# Patient Record
Sex: FEMALE | Race: WHITE | NOT HISPANIC OR LATINO | Employment: FULL TIME
[De-identification: names, ages, dates, MRNs, and addresses within clinical notes are randomized per-mention and may not be internally consistent; named-entity substitution may affect disease eponyms.]

---

## 2018-08-14 ENCOUNTER — HEALTH MAINTENANCE LETTER (OUTPATIENT)
Age: 43
End: 2018-08-14

## 2020-03-01 ENCOUNTER — HEALTH MAINTENANCE LETTER (OUTPATIENT)
Age: 45
End: 2020-03-01

## 2020-09-24 ENCOUNTER — OFFICE VISIT (OUTPATIENT)
Dept: FAMILY MEDICINE | Facility: CLINIC | Age: 45
End: 2020-09-24
Payer: COMMERCIAL

## 2020-09-24 VITALS
HEART RATE: 72 BPM | BODY MASS INDEX: 19.84 KG/M2 | DIASTOLIC BLOOD PRESSURE: 73 MMHG | WEIGHT: 130.9 LBS | OXYGEN SATURATION: 100 % | RESPIRATION RATE: 16 BRPM | SYSTOLIC BLOOD PRESSURE: 119 MMHG | TEMPERATURE: 98.6 F | HEIGHT: 68 IN

## 2020-09-24 DIAGNOSIS — Z13.220 SCREENING FOR LIPID DISORDERS: ICD-10-CM

## 2020-09-24 DIAGNOSIS — S39.012D BACK STRAIN, SUBSEQUENT ENCOUNTER: ICD-10-CM

## 2020-09-24 DIAGNOSIS — Z13.1 SCREENING FOR DIABETES MELLITUS: ICD-10-CM

## 2020-09-24 DIAGNOSIS — Z12.39 ENCOUNTER FOR SCREENING FOR MALIGNANT NEOPLASM OF BREAST: ICD-10-CM

## 2020-09-24 DIAGNOSIS — Z00.00 ROUTINE GENERAL MEDICAL EXAMINATION AT A HEALTH CARE FACILITY: Primary | ICD-10-CM

## 2020-09-24 DIAGNOSIS — M41.25 OTHER IDIOPATHIC SCOLIOSIS, THORACOLUMBAR REGION: ICD-10-CM

## 2020-09-24 DIAGNOSIS — Z12.4 SCREENING FOR CERVICAL CANCER: ICD-10-CM

## 2020-09-24 PROCEDURE — 82947 ASSAY GLUCOSE BLOOD QUANT: CPT | Performed by: FAMILY MEDICINE

## 2020-09-24 PROCEDURE — 80061 LIPID PANEL: CPT | Performed by: FAMILY MEDICINE

## 2020-09-24 PROCEDURE — G0145 SCR C/V CYTO,THINLAYER,RESCR: HCPCS | Performed by: FAMILY MEDICINE

## 2020-09-24 PROCEDURE — 36415 COLL VENOUS BLD VENIPUNCTURE: CPT | Performed by: FAMILY MEDICINE

## 2020-09-24 PROCEDURE — 87624 HPV HI-RISK TYP POOLED RSLT: CPT | Performed by: FAMILY MEDICINE

## 2020-09-24 PROCEDURE — 99396 PREV VISIT EST AGE 40-64: CPT | Performed by: FAMILY MEDICINE

## 2020-09-24 ASSESSMENT — MIFFLIN-ST. JEOR: SCORE: 1297.26

## 2020-09-24 NOTE — PATIENT INSTRUCTIONS
PLEASE CALL TO SCHEDULE YOUR MAMMOGRAM  [facility] [phone]  [facility] [phone]  [facility]   [phone]  Central Scheduling (all locations) [phone]    If you are under/uninsured, we recommend you contact the [facility]. They offer mammograms on a sliding fee charge. You can schedule with them at [phone].      Preventive Health Recommendations  Female Ages 40 to 49    Yearly exam:     See your health care provider every year in order to  1. Review health changes.   2. Discuss preventive care.    3. Review your medicines if your doctor prescribed any.      Get a Pap test every three years (unless you have an abnormal result and your provider advises testing more often).      If you get Pap tests with HPV test, you only need to test every 5 years, unless you have an abnormal result. You do not need a Pap test if your uterus was removed (hysterectomy) and you have not had cancer.      You should be tested each year for STDs (sexually transmitted diseases), if you're at risk.     Ask your doctor if you should have a mammogram.      Have a colonoscopy (test for colon cancer) if someone in your family has had colon cancer or polyps before age 50.       Have a cholesterol test every 5 years.       Have a diabetes test (fasting glucose) after age 45. If you are at risk for diabetes, you should have this test every 3 years.    Shots: Get a flu shot each year. Get a tetanus shot every 10 years.     Nutrition:     Eat at least 5 servings of fruits and vegetables each day.    Eat whole-grain bread, whole-wheat pasta and brown rice instead of white grains and rice.    Get adequate Calcium and Vitamin D.      Lifestyle    Exercise at least 150 minutes a week (an average of 30 minutes a day, 5 days a week). This will help you control your weight and prevent disease.    Limit alcohol to one drink per day.    No smoking.     Wear sunscreen to prevent skin  cancer.    See your dentist every six months for an exam and cleaning.

## 2020-09-24 NOTE — PROGRESS NOTES
SUBJECTIVE:   CC: Franca Griggs is an 45 year old woman who presents for preventive health visit.     Left lower back pain- intermittent, for 3 weeks, worse with sitting long  Dull pain right now  last week was more pronounced- after long [place] trip, was hard to get in and out of the bed    Got better on own-this week  Mild scoliosis and kyphotic.  Siting for long hours writing grants.  Better with back stretching  Patient has been advised of split billing requirements and indicates understanding: Yes  Healthy Habits:     Getting at least 3 servings of Calcium per day:  Yes    Bi-annual eye exam:  Yes    Dental care twice a year:  Yes    Sleep apnea or symptoms of sleep apnea:  None    Diet:  Regular (no restrictions)    Frequency of exercise:  4-5 days/week    Duration of exercise:  15-30 minutes    Taking medications regularly:  Yes    Medication side effects:  Not applicable    PHQ-2 Total Score: 0    Additional concerns today:  Yes          PROBLEMS TO ADD ON...    Today's PHQ-2 Score:   PHQ-2 ( 1999 Pfizer) 9/24/2020   Q1: Little interest or pleasure in doing things 0   Q2: Feeling down, depressed or hopeless 0   PHQ-2 Score 0   Q1: Little interest or pleasure in doing things Not at all   Q2: Feeling down, depressed or hopeless Not at all   PHQ-2 Score 0       Abuse: Current or Past (Physical, Sexual or Emotional) - No  Do you feel safe in your environment? Yes    Social History     Tobacco Use     Smoking status: Never Smoker     Smokeless tobacco: Never Used   Substance Use Topics     Alcohol use: Yes     Alcohol/week: 0.0 standard drinks     Comment: Socially     If you drink alcohol do you typically have >3 drinks per day or >7 drinks per week? No    Alcohol Use 9/24/2020   Prescreen: >3 drinks/day or >7 drinks/week? No   Prescreen: >3 drinks/day or >7 drinks/week? -   No flowsheet data found.    Reviewed orders with patient.  Reviewed health maintenance and updated orders accordingly - Yes  BP  "Readings from Last 3 Encounters:   09/24/20 119/73   02/07/16 115/78   09/03/15 108/64    Wt Readings from Last 3 Encounters:   09/24/20 59.4 kg (130 lb 14.4 oz)   02/05/16 82.1 kg (181 lb)   09/03/15 69.4 kg (153 lb)                    Mammogram Screening: Patient under age 50, mutual decision reflected in health maintenance.      Pertinent mammograms are reviewed under the imaging tab.  History of abnormal Pap smear: NO - age 30-65 PAP every 5 years with negative HPV co-testing recommended  PAP / HPV Latest Ref Rng & Units 6/10/2015 10/25/2011   PAP - NIL NIL   HPV 16 DNA NEG Negative -   HPV 18 DNA NEG Negative -   OTHER HR HPV NEG Negative -     Reviewed and updated as needed this visit by clinical staff  Tobacco  Allergies  Meds  Problems  Med Hx  Surg Hx  Fam Hx  Soc Hx          Reviewed and updated as needed this visit by Provider  Tobacco  Allergies  Meds  Problems  Med Hx  Surg Hx  Fam Hx            Review of Systems  CONSTITUTIONAL: NEGATIVE for fever, chills, change in weight  INTEGUMENTARU/SKIN: NEGATIVE for worrisome rashes, moles or lesions  EYES: NEGATIVE for vision changes or irritation  ENT: NEGATIVE for ear, mouth and throat problems  RESP: NEGATIVE for significant cough or SOB  BREAST: NEGATIVE for masses, tenderness or discharge  CV: NEGATIVE for chest pain, palpitations or peripheral edema  GI: NEGATIVE for nausea, abdominal pain, heartburn, or change in bowel habits  : NEGATIVE for unusual urinary or vaginal symptoms. Periods are regular.  MUSCULOSKELETAL: NEGATIVE for significant arthralgias or myalgia  NEURO: NEGATIVE for weakness, dizziness or paresthesias  PSYCHIATRIC: NEGATIVE for changes in mood or affect     OBJECTIVE:   /73   Pulse 72   Temp 98.6  F (37  C) (Oral)   Resp 16   Ht 1.727 m (5' 8\")   Wt 59.4 kg (130 lb 14.4 oz)   SpO2 100%   BMI 19.90 kg/m    Physical Exam  GENERAL: healthy, alert and no distress  EYES: Eyes grossly normal to inspection, PERRL " and conjunctivae and sclerae normal  HENT: ear canals and TM's normal, nose and mouth without ulcers or lesions  NECK: no adenopathy, no asymmetry, masses, or scars and thyroid normal to palpation  RESP: lungs clear to auscultation - no rales, rhonchi or wheezes  BREAST: she does have fibrocystic breast  But no distinct lumps today.normal without masses, tenderness or nipple discharge and no palpable axillary masses or adenopathy  CV: regular rate and rhythm, normal S1 S2, no S3 or S4, no murmur, click or rub, no peripheral edema and peripheral pulses strong  ABDOMEN: soft, nontender, no hepatosplenomegaly, no masses and bowel sounds normal   (female): normal female external genitalia, normal urethral meatus, vaginal mucosa pink, moist, well rugated, and normal cervix/adnexa/uterus without masses or discharge  MS: no gross musculoskeletal defects noted, no edema  SKIN: no suspicious lesions or rashes  NEURO: Normal strength and tone, mentation intact and speech normal  PSYCH: mentation appears normal, affect normal/bright    Diagnostic Test Results:  Labs reviewed in Epic  No results found for this or any previous visit (from the past 24 hour(s)).    ASSESSMENT/PLAN:   Franca 45 year old female was seen today for physical.    Diagnoses and all orders for this visit:    Routine general medical examination at a health care facility  Up to date  On vaccination  Family planning- protection- &  is in the process of looking  Into vasectomy    Screening for cervical cancer  -     Pap imaged thin layer screen with HPV - recommended age 30 - 65 years (select HPV order below)  -     HPV High Risk Types DNA Cervical    Screening for lipid disorders  -     Lipid Profile (Chol, Trig, HDL, LDL calc)    Screening for diabetes mellitus  -     Glucose    Strong family history of  Breast cancer- mom diagnosed at age 42  Also maternal grand mom & maternal aunty.  Franca reports genetic testing for mom was negative  She does has  "fibrocystic breast as well   Encounter for screening for malignant neoplasm of breast  -     MA Screen Bilateral w/Ricardo; Future    She has mild scoliosis and currently back strain is better- advised to monitor posture , ok to take over the counter ibuprofen as needed  and follow up as needed  In next few weeks      Patient has been advised of split billing requirements and indicates understanding: Yes  COUNSELING:  Reviewed preventive health counseling, as reflected in patient instructions       Regular exercise       Healthy diet/nutrition       Vision screening       Hearing screening    Estimated body mass index is 19.9 kg/m  as calculated from the following:    Height as of this encounter: 1.727 m (5' 8\").    Weight as of this encounter: 59.4 kg (130 lb 14.4 oz).        She reports that she has never smoked. She has never used smokeless tobacco.      Counseling Resources:  ATP IV Guidelines  Pooled Cohorts Equation Calculator  Breast Cancer Risk Calculator  BRCA-Related Cancer Risk Assessment: FHS-7 Tool  FRAX Risk Assessment  ICSI Preventive Guidelines  Dietary Guidelines for Americans, 2010  USDA's MyPlate  ASA Prophylaxis  Lung CA Screening    Lauren Busby MD  [facility]  "

## 2020-09-25 LAB
CHOLEST SERPL-MCNC: 152 MG/DL
GLUCOSE SERPL-MCNC: 88 MG/DL (ref 70–99)
HDLC SERPL-MCNC: 64 MG/DL
LDLC SERPL CALC-MCNC: 76 MG/DL
NONHDLC SERPL-MCNC: 88 MG/DL
TRIGL SERPL-MCNC: 60 MG/DL

## 2020-09-29 LAB
COPATH REPORT: NORMAL
PAP: NORMAL

## 2020-09-30 LAB
FINAL DIAGNOSIS: NORMAL
HPV HR 12 DNA CVX QL NAA+PROBE: NEGATIVE
HPV16 DNA SPEC QL NAA+PROBE: NEGATIVE
HPV18 DNA SPEC QL NAA+PROBE: NEGATIVE
SPECIMEN DESCRIPTION: NORMAL
SPECIMEN SOURCE CVX/VAG CYTO: NORMAL

## 2020-10-13 ENCOUNTER — ANCILLARY PROCEDURE (OUTPATIENT)
Dept: MAMMOGRAPHY | Facility: CLINIC | Age: 45
End: 2020-10-13
Attending: FAMILY MEDICINE
Payer: COMMERCIAL

## 2020-10-13 DIAGNOSIS — Z12.39 ENCOUNTER FOR SCREENING FOR MALIGNANT NEOPLASM OF BREAST: ICD-10-CM

## 2020-10-13 PROCEDURE — 77067 SCR MAMMO BI INCL CAD: CPT | Mod: GC | Performed by: STUDENT IN AN ORGANIZED HEALTH CARE EDUCATION/TRAINING PROGRAM

## 2020-10-13 PROCEDURE — 77063 BREAST TOMOSYNTHESIS BI: CPT | Mod: GC | Performed by: STUDENT IN AN ORGANIZED HEALTH CARE EDUCATION/TRAINING PROGRAM

## 2020-10-16 NOTE — RESULT ENCOUNTER NOTE
Sharath Schulte    Please let me know if you have not heard back from  Radiology for need for further imaging.  I am sure they will  Facilitate it but I do not see any future appointments.    Please keep us posted with questions or concerns .      Best Regards,    Lauren Busby MD  [facility]  [phone]

## 2020-10-23 ENCOUNTER — ANCILLARY PROCEDURE (OUTPATIENT)
Dept: MAMMOGRAPHY | Facility: CLINIC | Age: 45
End: 2020-10-23
Attending: FAMILY MEDICINE
Payer: COMMERCIAL

## 2020-10-23 DIAGNOSIS — R92.8 ABNORMAL MAMMOGRAM OF LEFT BREAST: ICD-10-CM

## 2020-10-23 PROCEDURE — 76642 ULTRASOUND BREAST LIMITED: CPT | Mod: LT | Performed by: RADIOLOGY

## 2020-10-23 PROCEDURE — 77065 DX MAMMO INCL CAD UNI: CPT | Mod: LT | Performed by: RADIOLOGY

## 2020-11-09 DIAGNOSIS — R92.8 ABNORMAL FINDING ON BREAST IMAGING: ICD-10-CM

## 2020-11-09 LAB
SARS-COV-2 RNA SPEC QL NAA+PROBE: NOT DETECTED
SPECIMEN SOURCE: NORMAL

## 2020-11-09 PROCEDURE — U0003 INFECTIOUS AGENT DETECTION BY NUCLEIC ACID (DNA OR RNA); SEVERE ACUTE RESPIRATORY SYNDROME CORONAVIRUS 2 (SARS-COV-2) (CORONAVIRUS DISEASE [COVID-19]), AMPLIFIED PROBE TECHNIQUE, MAKING USE OF HIGH THROUGHPUT TECHNOLOGIES AS DESCRIBED BY CMS-2020-01-R: HCPCS | Performed by: FAMILY MEDICINE

## 2020-11-12 ENCOUNTER — ANCILLARY PROCEDURE (OUTPATIENT)
Dept: MAMMOGRAPHY | Facility: CLINIC | Age: 45
End: 2020-11-12
Attending: FAMILY MEDICINE
Payer: COMMERCIAL

## 2020-11-12 DIAGNOSIS — R92.8 ABNORMAL MAMMOGRAM OF LEFT BREAST: ICD-10-CM

## 2020-11-12 DIAGNOSIS — R92.1 BREAST CALCIFICATIONS: Primary | ICD-10-CM

## 2020-11-12 PROCEDURE — 88305 TISSUE EXAM BY PATHOLOGIST: CPT | Mod: GC | Performed by: PATHOLOGY

## 2020-11-12 PROCEDURE — 77066 DX MAMMO INCL CAD BI: CPT | Performed by: RADIOLOGY

## 2020-11-12 PROCEDURE — 19081 BX BREAST 1ST LESION STRTCTC: CPT | Mod: LT | Performed by: RADIOLOGY

## 2020-11-12 RX ORDER — IOPAMIDOL 612 MG/ML
100 INJECTION, SOLUTION INTRAVASCULAR ONCE
Status: COMPLETED | OUTPATIENT
Start: 2020-11-12 | End: 2020-11-12

## 2020-11-12 RX ORDER — LIDOCAINE HYDROCHLORIDE AND EPINEPHRINE 10; 10 MG/ML; UG/ML
10 INJECTION, SOLUTION INFILTRATION; PERINEURAL ONCE
Status: COMPLETED | OUTPATIENT
Start: 2020-11-12 | End: 2020-11-12

## 2020-11-12 RX ADMIN — LIDOCAINE HYDROCHLORIDE AND EPINEPHRINE 10 ML: 10; 10 INJECTION, SOLUTION INFILTRATION; PERINEURAL at 13:46

## 2020-11-12 RX ADMIN — IOPAMIDOL 90 ML: 612 INJECTION, SOLUTION INTRAVASCULAR at 13:23

## 2020-11-16 LAB — COPATH REPORT: NORMAL

## 2020-11-16 NOTE — RESULT ENCOUNTER NOTE
Hi    No signs of breast cancer and that's great    Please keep us posted with questions or concerns .      Best Regards,    Lauren Busby MD  [facility]  [phone]

## 2020-12-14 ENCOUNTER — HEALTH MAINTENANCE LETTER (OUTPATIENT)
Age: 45
End: 2020-12-14

## 2021-10-02 ENCOUNTER — HEALTH MAINTENANCE LETTER (OUTPATIENT)
Age: 46
End: 2021-10-02

## 2021-10-26 ASSESSMENT — ENCOUNTER SYMPTOMS
FEVER: 0
HEMATOCHEZIA: 0
DYSURIA: 0
BREAST MASS: 0
ARTHRALGIAS: 0
HEMATURIA: 0
MYALGIAS: 0
FREQUENCY: 0
PALPITATIONS: 0
DIZZINESS: 0
CHILLS: 0
JOINT SWELLING: 0
SHORTNESS OF BREATH: 0
SORE THROAT: 0
NAUSEA: 0
DIARRHEA: 0
CONSTIPATION: 0
ABDOMINAL PAIN: 0
PARESTHESIAS: 0
HEARTBURN: 0
HEADACHES: 0
EYE PAIN: 0
COUGH: 0
WEAKNESS: 0
NERVOUS/ANXIOUS: 0

## 2021-10-27 NOTE — PROGRESS NOTES
SUBJECTIVE:   CC: Franca Griggs is an 46 year old woman who presents for preventive health visit.     Patient has been advised of split billing requirements and indicates understanding: Yes  Healthy Habits:     Getting at least 3 servings of Calcium per day:  Yes    Bi-annual eye exam:  Yes    Dental care twice a year:  Yes    Sleep apnea or symptoms of sleep apnea:  None    Diet:  Regular (no restrictions)    Frequency of exercise:  1 day/week    Duration of exercise:  15-30 minutes    Taking medications regularly:  Yes    Medication side effects:  None    PHQ-2 Total Score: 0    Additional concerns today:  Yes       concerns about Lump on the back of the patient's neck that started itching   Increase in size in past few month  Started in teenage yr as a small pimple    PROBLEMS TO ADD ON...    Today's PHQ-2 Score:   PHQ-2 ( 1999 Pfizer) 10/26/2021   Q1: Little interest or pleasure in doing things 0   Q2: Feeling down, depressed or hopeless 0   PHQ-2 Score 0   Q1: Little interest or pleasure in doing things Not at all   Q2: Feeling down, depressed or hopeless Not at all   PHQ-2 Score 0       Abuse: Current or Past (Physical, Sexual or Emotional) - No  Do you feel safe in your environment? Yes    Have you ever done Advance Care Planning? (For example, a Health Directive, POLST, or a discussion with a medical provider or your loved ones about your wishes): No, advance care planning information given to patient to review.  Advanced care planning was discussed at today's visit.    Social History     Tobacco Use     Smoking status: Never Smoker     Smokeless tobacco: Never Used   Substance Use Topics     Alcohol use: Yes     Alcohol/week: 0.0 standard drinks     Comment: Socially     If you drink alcohol do you typically have >3 drinks per day or >7 drinks per week? No    No flowsheet data found.    Reviewed orders with patient.  Reviewed health maintenance and updated orders accordingly - Yes  BP Readings from  Last 3 Encounters:   10/28/21 132/83   09/24/20 119/73   02/07/16 115/78    Wt Readings from Last 3 Encounters:   10/28/21 58.2 kg (128 lb 6.4 oz)   09/24/20 59.4 kg (130 lb 14.4 oz)   02/05/16 82.1 kg (181 lb)                    Breast Cancer Screening:    FHS-7:   Breast CA Risk Assessment (FHS-7) 10/26/2021   Did any of your first-degree relatives have breast or ovarian cancer? Yes   Did any of your relatives have bilateral breast cancer? Yes   Did any man in your family have breast cancer? No   Did any woman in your family have breast and ovarian cancer? No   Did any woman in your family have breast cancer before age 50 y? Yes   Do you have 2 or more relatives with breast and/or ovarian cancer? Yes   Do you have 2 or more relatives with breast and/or bowel cancer? Yes         Pertinent mammograms are reviewed under the imaging tab.    History of abnormal Pap smear: NO - age 30-65 PAP every 5 years with negative HPV co-testing recommended  PAP / HPV Latest Ref Rng & Units 9/24/2020 6/10/2015 10/25/2011   PAP (Historical) - NIL NIL NIL   HPV16 NEG:Negative Negative Negative -   HPV18 NEG:Negative Negative Negative -   HRHPV NEG:Negative Negative Negative -     Reviewed and updated as needed this visit by clinical staff  Tobacco  Allergies  Meds  Problems  Med Hx  Surg Hx  Fam Hx          Reviewed and updated as needed this visit by Provider  Tobacco  Allergies  Meds  Problems  Med Hx  Surg Hx  Fam Hx             Review of Systems   Constitutional: Negative for chills and fever.   HENT: Negative for congestion, ear pain, hearing loss and sore throat.    Eyes: Negative for pain and visual disturbance.   Respiratory: Negative for cough and shortness of breath.    Cardiovascular: Negative for chest pain, palpitations and peripheral edema.   Gastrointestinal: Negative for abdominal pain, constipation, diarrhea, heartburn, hematochezia and nausea.   Breasts:  Negative for tenderness, breast mass and  "discharge.   Genitourinary: Negative for dysuria, frequency, genital sores, hematuria, pelvic pain, urgency, vaginal bleeding and vaginal discharge.   Musculoskeletal: Negative for arthralgias, joint swelling and myalgias.   Skin: Negative for rash.   Neurological: Negative for dizziness, weakness, headaches and paresthesias.   Psychiatric/Behavioral: Negative for mood changes. The patient is not nervous/anxious.       OBJECTIVE:   /83   Pulse 97   Temp 98.7  F (37.1  C) (Temporal)   Ht 1.713 m (5' 7.44\")   Wt 58.2 kg (128 lb 6.4 oz)   LMP 10/13/2021   SpO2 100%   BMI 19.85 kg/m    Physical Exam  GENERAL: healthy, alert and no distress  EYES: Eyes grossly normal to inspection, PERRL and conjunctivae and sclerae normal  HENT: ear canals and TM's normal, nose and mouth without ulcers or lesions  NECK: thyroid normal to palpation and 1.5 inch broad firm mass easily palpable at the nape of the neck.   RESP: lungs clear to auscultation - no rales, rhonchi or wheezes  BREAST: normal without masses, tenderness or nipple discharge and no palpable axillary masses or adenopathy  CV: regular rate and rhythm, normal S1 S2, no S3 or S4, no murmur, click or rub, no peripheral edema and peripheral pulses strong  ABDOMEN: soft, nontender, no hepatosplenomegaly, no masses and bowel sounds normal  MS: no gross musculoskeletal defects noted, no edema  SKIN: no suspicious lesions or rashes  NEURO: Normal strength and tone, mentation intact and speech normal  PSYCH: mentation appears normal, affect normal/bright    Diagnostic Test Results:  Labs reviewed in Epic  No results found for this or any previous visit (from the past 24 hour(s)).    ASSESSMENT/PLAN:   Franca was seen today for physical.    Diagnoses and all orders for this visit:    Routine general medical examination at a health care facility  Comments:  NILM pap 11/2020- next due 11/2025.  annual mammogram    Mass of neck  -DDx include epidermal cyst, vs enalrged LN " "benign vs malignancy  excisional biopsy is recommend         Adult General Surg Referral  -     CBC with platelets    Other idiopathic scoliosis, thoracolumbar region    Fibrocystic breast disease (FCBD), unspecified laterality    Encounter for screening mammogram for malignant neoplasm of breast  -     MA Screen Bilateral w/Ricardo; Future    Other orders  -     REVIEW OF HEALTH MAINTENANCE PROTOCOL ORDERS        Patient has been advised of split billing requirements and indicates understanding: Yes  COUNSELING:  Reviewed preventive health counseling, as reflected in patient instructions       Regular exercise       Healthy diet/nutrition       Family planning       Folic Acid       Colon cancer screening    Estimated body mass index is 19.85 kg/m  as calculated from the following:    Height as of this encounter: 1.713 m (5' 7.44\").    Weight as of this encounter: 58.2 kg (128 lb 6.4 oz).        She reports that she has never smoked. She has never used smokeless tobacco.      Counseling Resources:  ATP IV Guidelines  Pooled Cohorts Equation Calculator  Breast Cancer Risk Calculator  BRCA-Related Cancer Risk Assessment: FHS-7 Tool  FRAX Risk Assessment  ICSI Preventive Guidelines  Dietary Guidelines for Americans, 2010  USDA's MyPlate  ASA Prophylaxis  Lung CA Screening    Lauren Busby MD  [facility]  "

## 2021-10-28 ENCOUNTER — OFFICE VISIT (OUTPATIENT)
Dept: FAMILY MEDICINE | Facility: CLINIC | Age: 46
End: 2021-10-28
Payer: COMMERCIAL

## 2021-10-28 VITALS
OXYGEN SATURATION: 100 % | DIASTOLIC BLOOD PRESSURE: 83 MMHG | BODY MASS INDEX: 20.15 KG/M2 | WEIGHT: 128.4 LBS | HEIGHT: 67 IN | HEART RATE: 97 BPM | SYSTOLIC BLOOD PRESSURE: 132 MMHG | TEMPERATURE: 98.7 F

## 2021-10-28 DIAGNOSIS — Z12.31 ENCOUNTER FOR SCREENING MAMMOGRAM FOR MALIGNANT NEOPLASM OF BREAST: ICD-10-CM

## 2021-10-28 DIAGNOSIS — N60.19 FIBROCYSTIC BREAST DISEASE (FCBD), UNSPECIFIED LATERALITY: ICD-10-CM

## 2021-10-28 DIAGNOSIS — Z00.00 ROUTINE GENERAL MEDICAL EXAMINATION AT A HEALTH CARE FACILITY: Primary | ICD-10-CM

## 2021-10-28 DIAGNOSIS — R22.1 MASS OF NECK: ICD-10-CM

## 2021-10-28 DIAGNOSIS — M41.25 OTHER IDIOPATHIC SCOLIOSIS, THORACOLUMBAR REGION: ICD-10-CM

## 2021-10-28 LAB
ERYTHROCYTE [DISTWIDTH] IN BLOOD BY AUTOMATED COUNT: 13.2 % (ref 10–15)
HCT VFR BLD AUTO: 44 % (ref 35–47)
HGB BLD-MCNC: 13.9 G/DL (ref 11.7–15.7)
MCH RBC QN AUTO: 28.6 PG (ref 26.5–33)
MCHC RBC AUTO-ENTMCNC: 31.6 G/DL (ref 31.5–36.5)
MCV RBC AUTO: 91 FL (ref 78–100)
PLATELET # BLD AUTO: 326 10E3/UL (ref 150–450)
RBC # BLD AUTO: 4.86 10E6/UL (ref 3.8–5.2)
WBC # BLD AUTO: 6.5 10E3/UL (ref 4–11)

## 2021-10-28 PROCEDURE — 99396 PREV VISIT EST AGE 40-64: CPT | Performed by: FAMILY MEDICINE

## 2021-10-28 PROCEDURE — 36415 COLL VENOUS BLD VENIPUNCTURE: CPT | Performed by: FAMILY MEDICINE

## 2021-10-28 PROCEDURE — 85027 COMPLETE CBC AUTOMATED: CPT | Performed by: FAMILY MEDICINE

## 2021-10-28 ASSESSMENT — MIFFLIN-ST. JEOR: SCORE: 1272.05

## 2021-10-28 NOTE — RESULT ENCOUNTER NOTE
Hi    -Normal red blood cell (hgb) levels, normal white blood cell count and normal platelet levels.    Proceed with excisional biopsy as discussed in appointment today.    Please keep us posted with questions or concerns .      Best Regards,    Lauren Busby MD  [facility]  [phone]

## 2021-11-04 ENCOUNTER — TELEPHONE (OUTPATIENT)
Dept: SURGERY | Facility: CLINIC | Age: 46
End: 2021-11-04

## 2021-11-04 ENCOUNTER — OFFICE VISIT (OUTPATIENT)
Dept: SURGERY | Facility: CLINIC | Age: 46
End: 2021-11-04
Attending: FAMILY MEDICINE
Payer: COMMERCIAL

## 2021-11-04 DIAGNOSIS — L72.3 SEBACEOUS CYST: Primary | ICD-10-CM

## 2021-11-04 PROCEDURE — 99242 OFF/OP CONSLTJ NEW/EST SF 20: CPT | Performed by: SURGERY

## 2021-11-04 NOTE — LETTER
November 4, 2021          Lauren Busby MD  [address] [place] [address]      RE:   Franca Griggs 1975      Dear Colleague,    Thank you for referring your patient, Franca Griggs, to [facility]. Please see a copy of my visit note below.    HPI: Patient is a 46-year-old female referred by the above provider for consultation regarding posterior neck mass.  She reports that this has been present since she was a teenager.  It slowly increased in size.  Causes occasional discomfort.  She has had no episodes of infection or drainage.  Some occasional discomfort associated with this.     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  PSH:    has a past surgical history that includes Extraction(s) dental.  Social History:   reports that she has never smoked. She has never used smokeless tobacco. She reports current alcohol use. She reports that she does not use drugs.  Family History:   family history includes Breast Cancer (age of onset: 41) in her mother; Breast Cancer (age of onset: 60) in her maternal aunt and maternal grandmother; C.A.D. (age of onset: 50) in her maternal grandfather; Cerebrovascular Disease (age of onset: 80) in her paternal grandfather; Hypertension in her father; Lipids in her father.  Medications/Allergies: Home medications and allergies reviewed.     ROS:  The 10 point Review of Systems is negative other than noted in the HPI.     Physical Exam:  [facility] 10/13/2021   GENERAL: Generally appears well.  Psych: Alert and Oriented.  Normal affect  Eyes: Sclera clear  Respiratory: Normal respiratory rate, no audible wheezes   Integumentary:  No rashes, the posterior neck there is an elongated palpable mass is quite protuberant.  Somewhat firm.  Certainly more firm than I would expect  with a lipoma as well as more superficial than I would expect with a lipoma I think this likely represents a sebaceous cyst.  Neurological: grossly intact     All new lab and imaging data was reviewed.      Impression and Plan:  Patient is a 46 year old female with posterior neck mass likely sebaceous cyst     PLAN: Options were discussed.  I think this would be easily treated with excision under local anesthetic.  The details of the procedure were outlined.  The patient is interested in proceeding.  This will be scheduled at a date of her convenience.       Again, thank you for allowing me to participate in the care of your patient.      Sincerely,      Trip Ragsdale MD

## 2021-11-04 NOTE — PROGRESS NOTES
[facility]  Surgery Consultation    PCP:  Lauren Busby [phone]    HPI: Patient is a 46-year-old female referred by the above provider for consultation regarding posterior neck mass.  She reports that this has been present since she was a teenager.  It slowly increased in size.  Causes occasional discomfort.  She has had no episodes of infection or drainage.  Some occasional discomfort associated with this.    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  PSH:    has a past surgical history that includes Extraction(s) dental.  Social History:   reports that she has never smoked. She has never used smokeless tobacco. She reports current alcohol use. She reports that she does not use drugs.  Family History:   family history includes Breast Cancer (age of onset: 41) in her mother; Breast Cancer (age of onset: 60) in her maternal aunt and maternal grandmother; C.A.D. (age of onset: 50) in her maternal grandfather; Cerebrovascular Disease (age of onset: 80) in her paternal grandfather; Hypertension in her father; Lipids in her father.  Medications/Allergies: Home medications and allergies reviewed.    ROS:  The 10 point Review of Systems is negative other than noted in the HPI.    Physical Exam:  [facility] 10/13/2021   GENERAL: Generally appears well.  Psych: Alert and Oriented.  Normal affect  Eyes: Sclera clear  Respiratory: Normal respiratory rate, no audible wheezes   Integumentary:  No rashes, the posterior neck there is an elongated palpable mass is quite protuberant.  Somewhat firm.  Certainly more firm than I would expect with a lipoma as well as more superficial than I would expect with a lipoma I think this likely represents a sebaceous cyst.  Neurological: grossly intact    All new lab and imaging data was reviewed.      Impression and Plan:  Patient is a 46 year old female with posterior neck mass likely sebaceous cyst    PLAN: Options were discussed.  I think this would be easily treated with excision under local anesthetic.  The details of the procedure were outlined.  The patient is interested in proceeding.  This will be scheduled at a date of her convenience.      Thank you very much for this consult.    Trip Ragsdale M.D.  [facility]  [phone]    Please route or send letter to:  Primary Care Provider (PCP) and Referring Provider

## 2021-11-04 NOTE — TELEPHONE ENCOUNTER
Orders received for excision sebaceous cyst with Dr. Trip Ragsdale.       Left message for patient to call me at her convenience to schedule surgery.     Mary CRUM    Surgery Coordinator  [facility]  [facility]  [phone]

## 2021-11-05 DIAGNOSIS — Z11.59 ENCOUNTER FOR SCREENING FOR OTHER VIRAL DISEASES: ICD-10-CM

## 2021-11-07 ENCOUNTER — TELEPHONE (OUTPATIENT)
Dept: SURGERY | Facility: CLINIC | Age: 46
End: 2021-11-07
Payer: COMMERCIAL

## 2021-11-07 NOTE — TELEPHONE ENCOUNTER
Type of surgery: Excision posterior neck sebaceous cyst  Location of surgery: [facility] OR  Date and time of surgery: 11/16/21 at 10:30am  Surgeon: Dr. Trip Ragsdale  Pre-Op Appt Date: Patient to schedule  Post-Op Appt Date: Patient to schedule   Packet sent out: Yes  Pre-cert/Authorization completed:  Not Applicable  Date: 11/7/21

## 2021-11-13 ENCOUNTER — LAB (OUTPATIENT)
Dept: URGENT CARE | Facility: URGENT CARE | Age: 46
End: 2021-11-13
Payer: COMMERCIAL

## 2021-11-13 DIAGNOSIS — Z11.59 ENCOUNTER FOR SCREENING FOR OTHER VIRAL DISEASES: ICD-10-CM

## 2021-11-13 PROCEDURE — U0005 INFEC AGEN DETEC AMPLI PROBE: HCPCS

## 2021-11-13 PROCEDURE — U0003 INFECTIOUS AGENT DETECTION BY NUCLEIC ACID (DNA OR RNA); SEVERE ACUTE RESPIRATORY SYNDROME CORONAVIRUS 2 (SARS-COV-2) (CORONAVIRUS DISEASE [COVID-19]), AMPLIFIED PROBE TECHNIQUE, MAKING USE OF HIGH THROUGHPUT TECHNOLOGIES AS DESCRIBED BY CMS-2020-01-R: HCPCS

## 2021-11-15 LAB — SARS-COV-2 RNA RESP QL NAA+PROBE: NEGATIVE

## 2021-11-15 RX ORDER — LIDOCAINE HYDROCHLORIDE 10 MG/ML
10 INJECTION, SOLUTION EPIDURAL; INFILTRATION; INTRACAUDAL; PERINEURAL ONCE
Status: CANCELLED | OUTPATIENT
Start: 2021-11-15

## 2021-11-16 ENCOUNTER — APPOINTMENT (OUTPATIENT)
Dept: SURGERY | Facility: PHYSICIAN GROUP | Age: 46
End: 2021-11-16
Payer: COMMERCIAL

## 2021-11-16 ENCOUNTER — HOSPITAL ENCOUNTER (OUTPATIENT)
Facility: CLINIC | Age: 46
Discharge: HOME | End: 2021-11-16
Attending: SURGERY | Admitting: SURGERY
Payer: COMMERCIAL

## 2021-11-16 VITALS — RESPIRATION RATE: 16 BRPM | OXYGEN SATURATION: 100 %

## 2021-11-16 DIAGNOSIS — G89.18 POSTOPERATIVE PAIN: Primary | ICD-10-CM

## 2021-11-16 DIAGNOSIS — L72.3 SEBACEOUS CYST: ICD-10-CM

## 2021-11-16 PROCEDURE — 12042 INTMD RPR N-HF/GENIT2.6-7.5: CPT

## 2021-11-16 PROCEDURE — 88304 TISSUE EXAM BY PATHOLOGIST: CPT | Mod: TC | Performed by: SURGERY

## 2021-11-16 PROCEDURE — 11422 EXC H-F-NK-SP B9+MARG 1.1-2: CPT | Performed by: SURGERY

## 2021-11-16 PROCEDURE — 250N000009 HC RX 250: Performed by: SURGERY

## 2021-11-16 PROCEDURE — 11420 EXC H-F-NK-SP B9+MARG 0.5/<: CPT | Performed by: SURGERY

## 2021-11-16 PROCEDURE — 11424 EXC H-F-NK-SP B9+MARG 3.1-4: CPT | Performed by: SURGERY

## 2021-11-16 RX ORDER — HYDROCODONE BITARTRATE AND ACETAMINOPHEN 5; 325 MG/1; MG/1
1 TABLET ORAL EVERY 4 HOURS PRN
Qty: 5 TABLET | Refills: 0 | Status: SHIPPED | OUTPATIENT
Start: 2021-11-16 | End: 2021-11-19

## 2021-11-16 RX ORDER — HYDROCODONE BITARTRATE AND ACETAMINOPHEN 5; 325 MG/1; MG/1
1 TABLET ORAL EVERY 4 HOURS PRN
Qty: 5 TABLET | Refills: 0 | Status: CANCELLED | OUTPATIENT
Start: 2021-11-16 | End: 2021-11-19

## 2021-11-16 RX ADMIN — LIDOCAINE HYDROCHLORIDE 1 ML: 10; .005 INJECTION, SOLUTION EPIDURAL; INFILTRATION; INTRACAUDAL; PERINEURAL at 10:35

## 2021-11-16 NOTE — OP NOTE
Preoperative diagnosis: Posterior neck cyst    Postoperative diagnosis: Same (4 cm in greatest transverse dimension)    Procedure: Excision of posterior neck cyst with multilayer closure    Surgeon: Trip Ragsdale MD    Assistant: Shirley Hooper PA-C, Physician assistant first assistant was necessary during this procedure due to expertise in patient positioning, prepping, incisional opening, retraction, exposure, and suctioning.    Anesthesia: Local    Estimated blood loss: 5 cc    Specimens: Posterior neck cyst for permanent    Indication for procedure: This a 46-year-old female who presented to my office with a longstanding posterior neck mass.  This seemed to be consistent with an inclusion cyst.  We discussed management options.  Is elected to see with excision.    Procedure: After informed consent was obtained the patient was taken to one of the endoscopy suite and [facility].  Informed consent was obtained.  She was placed in a prone position.  Area on the posterior neck was shaved, prepped and draped in usual manner.  Timeout was completed.  Local anesthetic was then injected create a field block.  Transverse incision was made overlying this elongated mass.  Sharp dissection was carried to the subcutaneous tissues.  An obvious inclusion cyst was encountered.  This was somewhat friable but with a combination of sharp as well as blunt dissection this was mobilized and ultimately excised.  The cavity was examined and there was no evidence of residual cyst tissue.  This was irrigated with local anesthetic.  The incision was then closed in a multilayer fashion using 3-0 Vicryl in the deep layers and subcuticular 4-0 Monocryl on the skin.  Steri-Strips and dressing were applied.  Patient tolerated this well.  She left in a stable and ambulatory condition.    Trip Ragsdale MD

## 2021-11-16 NOTE — LETTER
[facility] - [facility]  Discharge Instructions: Post-Operative Excision of Mass or Lesion    ACTIVITY    Take frequent, short walks and increase your activity gradually.      Avoid strenuous physical activity or heavy lifting greater than 15-20 lbs. for 1-2 weeks.  You may climb stairs.    You may drive without restrictions when you are not using any prescription pain medication and feel comfortable in a car.    You may return to work/school when you are comfortable without any prescription pain medication.    WOUND CARE    You may remove your outer dressing or Band-Aids and shower 48 hours after the surgery.  Pat your incisions dry and leave them open to air.  Re-apply dressing (Band-Aids or gauze/tape) as needed for comfort or drainage.    You may have steri-strips (looks like white tape) or skin glue on your incisions.  You may peel off the steri-strips 2 weeks after your surgery if they have not peeled off on their own.  If you have skin glue, it will peel up and fall off on its own.    Do not soak your incisions in a tub or pool for 2 weeks.     Do not apply any lotions, creams, or ointments to your incision(s).    A ridge under your incision(s) is normal and will gradually resolve.    DIET    Start with liquids, then gradually resume your regular diet as tolerated.     Drink plenty of liquids to stay hydrated.    PAIN    Expect some tenderness and discomfort at the incision site(s).  Use the prescribed pain medication at your discretion.  Expect gradual resolution of your pain over several days.    You may take ibuprofen with food (unless you have been told not to) or acetaminophen/Tylenol instead of or in addition to your prescribed pain medication.  If you are taking Norco, do not take any additional acetaminophen/Tylenol.    Do not drink alcohol or drive while you are taking pain medications.    You may apply ice to your incisions in 20 minute intervals as needed for the next 48 hours.   After that time, consider switching to heat if you prefer.    EXPECTATIONS    Pain medications can cause constipation.  Limit use when possible.  Take over the counter stool softener/stimulant, such as Colace or Senna, 1-2 times a day with plenty of water.  You may take a mild over the counter laxative, such as Miralax or a suppository, as needed.      You may discontinue these medications once you are having regular bowel movements and/or are no longer taking your narcotic pain medication.    FOLLOW UP    You do not need to follow up with our clinic. If have any questions or concerns, or would like to be seen, please call us at [phone] and ask to speak with our nurse.  We are located at [address] [place] [address].    CALL OUR OFFICE -[phone] IF YOU HAVE:     Chills or fever above 101 F.    Increased redness, warmth, or drainage at your incisions.    Significant bleeding.    Pain not relieved by your pain medication or rest.    Increasing pain after the first 48 hours.    Any other concerns or questions.               **If you have concerns or questions about your procedure,    please contact Dr. Ragsdale at  [phone]**

## 2021-11-17 LAB
PATH REPORT.COMMENTS IMP SPEC: NORMAL
PATH REPORT.COMMENTS IMP SPEC: NORMAL
PATH REPORT.FINAL DX SPEC: NORMAL
PATH REPORT.GROSS SPEC: NORMAL
PATH REPORT.MICROSCOPIC SPEC OTHER STN: NORMAL
PATH REPORT.RELEVANT HX SPEC: NORMAL
PHOTO IMAGE: NORMAL

## 2021-11-17 PROCEDURE — 88304 TISSUE EXAM BY PATHOLOGIST: CPT | Mod: 26 | Performed by: PATHOLOGY

## 2021-11-18 ENCOUNTER — ANCILLARY PROCEDURE (OUTPATIENT)
Dept: MAMMOGRAPHY | Facility: CLINIC | Age: 46
End: 2021-11-18
Attending: FAMILY MEDICINE
Payer: COMMERCIAL

## 2021-11-18 DIAGNOSIS — Z12.31 ENCOUNTER FOR SCREENING MAMMOGRAM FOR MALIGNANT NEOPLASM OF BREAST: ICD-10-CM

## 2021-11-18 PROCEDURE — 77067 SCR MAMMO BI INCL CAD: CPT | Performed by: STUDENT IN AN ORGANIZED HEALTH CARE EDUCATION/TRAINING PROGRAM

## 2021-11-18 PROCEDURE — 77063 BREAST TOMOSYNTHESIS BI: CPT | Performed by: STUDENT IN AN ORGANIZED HEALTH CARE EDUCATION/TRAINING PROGRAM

## 2022-09-03 ENCOUNTER — HEALTH MAINTENANCE LETTER (OUTPATIENT)
Age: 47
End: 2022-09-03

## 2023-01-15 ENCOUNTER — HEALTH MAINTENANCE LETTER (OUTPATIENT)
Age: 48
End: 2023-01-15

## 2023-04-29 ENCOUNTER — HEALTH MAINTENANCE LETTER (OUTPATIENT)
Age: 48
End: 2023-04-29

## 2023-06-06 ENCOUNTER — ANCILLARY PROCEDURE (OUTPATIENT)
Dept: MAMMOGRAPHY | Facility: CLINIC | Age: 48
End: 2023-06-06
Attending: FAMILY MEDICINE
Payer: COMMERCIAL

## 2023-06-06 DIAGNOSIS — Z12.31 VISIT FOR SCREENING MAMMOGRAM: ICD-10-CM

## 2023-06-06 PROCEDURE — 77063 BREAST TOMOSYNTHESIS BI: CPT | Performed by: RADIOLOGY

## 2023-06-06 PROCEDURE — 77067 SCR MAMMO BI INCL CAD: CPT | Performed by: RADIOLOGY

## 2023-07-10 ENCOUNTER — OFFICE VISIT (OUTPATIENT)
Dept: FAMILY MEDICINE | Facility: CLINIC | Age: 48
End: 2023-07-10
Payer: COMMERCIAL

## 2023-07-10 VITALS
RESPIRATION RATE: 16 BRPM | HEART RATE: 95 BPM | BODY MASS INDEX: 20 KG/M2 | TEMPERATURE: 97 F | WEIGHT: 132 LBS | HEIGHT: 68 IN | OXYGEN SATURATION: 98 % | DIASTOLIC BLOOD PRESSURE: 70 MMHG | SYSTOLIC BLOOD PRESSURE: 118 MMHG

## 2023-07-10 DIAGNOSIS — Z80.3 FAMILY HISTORY OF MALIGNANT NEOPLASM OF BREAST: ICD-10-CM

## 2023-07-10 DIAGNOSIS — M41.25 OTHER IDIOPATHIC SCOLIOSIS, THORACOLUMBAR REGION: ICD-10-CM

## 2023-07-10 DIAGNOSIS — E55.9 VITAMIN D INSUFFICIENCY: ICD-10-CM

## 2023-07-10 DIAGNOSIS — Z11.59 NEED FOR HEPATITIS C SCREENING TEST: ICD-10-CM

## 2023-07-10 DIAGNOSIS — Z00.00 ROUTINE GENERAL MEDICAL EXAMINATION AT A HEALTH CARE FACILITY: Primary | ICD-10-CM

## 2023-07-10 DIAGNOSIS — Z13.1 SCREENING FOR DIABETES MELLITUS: ICD-10-CM

## 2023-07-10 DIAGNOSIS — Z13.220 SCREENING FOR LIPID DISORDERS: ICD-10-CM

## 2023-07-10 DIAGNOSIS — Z12.11 SCREEN FOR COLON CANCER: ICD-10-CM

## 2023-07-10 LAB
CHOLEST SERPL-MCNC: 169 MG/DL
DEPRECATED CALCIDIOL+CALCIFEROL SERPL-MC: 38 UG/L (ref 20–75)
FASTING STATUS PATIENT QL REPORTED: YES
GLUCOSE SERPL-MCNC: 95 MG/DL (ref 70–99)
HCV AB SERPL QL IA: NONREACTIVE
HDLC SERPL-MCNC: 62 MG/DL
LDLC SERPL CALC-MCNC: 95 MG/DL
NONHDLC SERPL-MCNC: 107 MG/DL
TRIGL SERPL-MCNC: 62 MG/DL

## 2023-07-10 PROCEDURE — 91312 COVID-19 BIVALENT 12+ (PFIZER): CPT | Performed by: FAMILY MEDICINE

## 2023-07-10 PROCEDURE — 86803 HEPATITIS C AB TEST: CPT | Performed by: FAMILY MEDICINE

## 2023-07-10 PROCEDURE — 80061 LIPID PANEL: CPT | Performed by: FAMILY MEDICINE

## 2023-07-10 PROCEDURE — 0121A COVID-19 BIVALENT 12+ (PFIZER): CPT | Performed by: FAMILY MEDICINE

## 2023-07-10 PROCEDURE — 99396 PREV VISIT EST AGE 40-64: CPT | Mod: 25 | Performed by: FAMILY MEDICINE

## 2023-07-10 PROCEDURE — 82947 ASSAY GLUCOSE BLOOD QUANT: CPT | Performed by: FAMILY MEDICINE

## 2023-07-10 PROCEDURE — 82306 VITAMIN D 25 HYDROXY: CPT | Performed by: FAMILY MEDICINE

## 2023-07-10 PROCEDURE — 36415 COLL VENOUS BLD VENIPUNCTURE: CPT | Performed by: FAMILY MEDICINE

## 2023-07-10 ASSESSMENT — ENCOUNTER SYMPTOMS
WEAKNESS: 0
FEVER: 0
FREQUENCY: 0
DIZZINESS: 0
DIARRHEA: 0
PARESTHESIAS: 0
HEMATOCHEZIA: 0
EYE PAIN: 0
MYALGIAS: 0
SORE THROAT: 0
ABDOMINAL PAIN: 0
HEMATURIA: 0
CHILLS: 0
BREAST MASS: 0
JOINT SWELLING: 0
DYSURIA: 0
COUGH: 0
NERVOUS/ANXIOUS: 0
SHORTNESS OF BREATH: 0
NAUSEA: 0
HEADACHES: 0
HEARTBURN: 0
ARTHRALGIAS: 0
PALPITATIONS: 0
CONSTIPATION: 0

## 2023-07-10 ASSESSMENT — PAIN SCALES - GENERAL: PAINLEVEL: NO PAIN (0)

## 2023-07-10 NOTE — PROGRESS NOTES
SUBJECTIVE:   CC: Franca is an 48 year old who presents for preventive health visit.       7/10/2023     9:27 AM   Additional Questions   Roomed by KARISSA MARCH   Accompanied by N/A         7/10/2023     9:27 AM   Patient Reported Additional Medications   Patient reports taking the following new medications N/A     Healthy Habits:     Getting at least 3 servings of Calcium per day:  Yes    Bi-annual eye exam:  Yes    Dental care twice a year:  Yes    Sleep apnea or symptoms of sleep apnea:  None    Diet:  Regular (no restrictions)    Frequency of exercise:  2-3 days/week    Duration of exercise:  30-45 minutes    Taking medications regularly:  Yes    Medication side effects:  None    Additional concerns today:  No  pap NILM 09/2020 q 3yrs next 9/2025  Mammogram Up to date  6/2023  Colon cancer options discussed & pt declined colonoscopy and willing to complete cologard  Today's PHQ-2 Score:       7/10/2023     8:03 AM   PHQ-2 ( 1999 [employer])   Q1: Little interest or pleasure in doing things 0   Q2: Feeling down, depressed or hopeless 0   PHQ-2 Score 0   Q1: Little interest or pleasure in doing things Not at all   Q2: Feeling down, depressed or hopeless Not at all   PHQ-2 Score 0         Social History     Tobacco Use     Smoking status: Never     Smokeless tobacco: Never   Substance Use Topics     Alcohol use: Yes     Comment: Socially           7/10/2023     8:02 AM   Alcohol Use   Prescreen: >3 drinks/day or >7 drinks/week? No     Reviewed orders with patient.  Reviewed health maintenance and updated orders accordingly - Yes  BP Readings from Last 3 Encounters:   07/10/23 118/70   10/28/21 132/83   09/24/20 119/73    Wt Readings from Last 3 Encounters:   07/10/23 59.9 kg (132 lb)   10/28/21 58.2 kg (128 lb 6.4 oz)   09/24/20 59.4 kg (130 lb 14.4 oz)                    Breast Cancer Screening:    FHS-7:       10/26/2021     7:52 PM 11/18/2021     1:22 PM 6/6/2023    11:34 AM 7/10/2023     8:04 AM   Breast CA Risk  Assessment (FHS-7)   Did any of your first-degree relatives have breast or ovarian cancer? Yes Yes Yes Yes   Did any of your relatives have bilateral breast cancer? Yes Yes Yes Yes   Did any man in your family have breast cancer? No No No No   Did any woman in your family have breast and ovarian cancer? No No No No   Did any woman in your family have breast cancer before age 50 y? Yes Yes Yes Yes   Do you have 2 or more relatives with breast and/or ovarian cancer? Yes Yes Yes Yes   Do you have 2 or more relatives with breast and/or bowel cancer? Yes Yes Yes No     click delete button to remove this line now  Mammogram Screening: Recommended annual mammography  Pertinent mammograms are reviewed under the imaging tab.    History of abnormal Pap smear: NO - age 30-65 PAP every 5 years with negative HPV co-testing recommended      Latest Ref Rng & Units 9/24/2020     9:03 AM 9/24/2020     8:58 AM 6/10/2015    10:16 AM   PAP / HPV   PAP (Historical)   NIL     HPV 16 DNA NEG^Negative Negative   Negative    HPV 18 DNA NEG^Negative Negative   Negative    Other HR HPV NEG^Negative Negative   Negative      Reviewed and updated as needed this visit by clinical staff   Tobacco  Allergies  Meds  Problems  Med Hx  Surg Hx  Fam Hx          Reviewed and updated as needed this visit by Provider      Review of Systems   Constitutional: Negative for chills and fever.   HENT: Negative for congestion, ear pain, hearing loss and sore throat.    Eyes: Negative for pain and visual disturbance.   Respiratory: Negative for cough and shortness of breath.    Cardiovascular: Negative for chest pain, palpitations and peripheral edema.   Gastrointestinal: Negative for abdominal pain, constipation, diarrhea, heartburn, hematochezia and nausea.   Breasts:  Negative for tenderness, breast mass and discharge.   Genitourinary: Negative for dysuria, frequency, genital sores, hematuria, pelvic pain, urgency, vaginal bleeding and vaginal  "discharge.   Musculoskeletal: Negative for arthralgias, joint swelling and myalgias.   Skin: Negative for rash.   Neurological: Negative for dizziness, weakness, headaches and paresthesias.   Psychiatric/Behavioral: Negative for mood changes. The patient is not nervous/anxious.         OBJECTIVE:   /70 (BP Location: Left arm, Patient Position: Sitting, Cuff Size: Adult Regular)   Pulse 95   Temp 97  F (36.1  C) (Temporal)   Resp 16   Ht 1.73 m (5' 8.11\")   Wt 59.9 kg (132 lb)   LMP 06/15/2023 (Exact Date)   SpO2 98%   BMI 20.01 kg/m    Physical Exam  GENERAL: healthy, alert and no distress  EYES: Eyes grossly normal to inspection, PERRL and conjunctivae and sclerae normal  HENT: ear canals and TM's normal, nose and mouth without ulcers or lesions  NECK: no adenopathy, no asymmetry, masses, or scars and thyroid normal to palpation  RESP: lungs clear to auscultation - no rales, rhonchi or wheezes  BREAST: normal without masses, tenderness or nipple discharge and no palpable axillary masses or adenopathy  CV: regular rate and rhythm, normal S1 S2, no S3 or S4, no murmur, click or rub, no peripheral edema and peripheral pulses strong  ABDOMEN: soft, nontender, no hepatosplenomegaly, no masses and bowel sounds normal  MS: no gross musculoskeletal defects noted, no edema  SKIN: no suspicious lesions or rashes  NEURO: Normal strength and tone, mentation intact and speech normal  PSYCH: mentation appears normal, affect normal/bright    Diagnostic Test Results:  Labs reviewed in Epic  No results found for this or any previous visit (from the past 24 hour(s)).    ASSESSMENT/PLAN:   Franca was seen today for physical.    Diagnoses and all orders for this visit:    Routine general medical examination at a health care facility  Comments:  pap NILM 09/2020 q 3yrs next 9/2025  Mammogram Up to date  6/2023  Colon cancer options discussed & pt declined colonoscopy  willing to complete cologard    Screen for colon " cancer  -     Cancel: Colonoscopy Screening  Referral; Future    Family history of malignant neoplasm of breast  Comments:  MOM -40's. maternal aunty& grand mom-,in menpausal yrs. mom &  maternal aunty-negative genetic testing.  Reviewed genetic testing referral offered.Pt declined.    Vitamin D insufficiency  -     Vitamin D Deficiency; Future  -     Vitamin D Deficiency    Need for hepatitis C screening test  -     Hepatitis C Screen Reflex to HCV RNA Quant and Genotype; Future  -     Hepatitis C Screen Reflex to HCV RNA Quant and Genotype    Other idiopathic scoliosis, thoracolumbar region  Comments:  no acute or chronic pain concerns     Screening for lipid disorders  -     Lipid Profile (Chol, Trig, HDL, LDL calc); Future  -     Lipid Profile (Chol, Trig, HDL, LDL calc)    Screening for diabetes mellitus  -     Glucose; Future  -     Glucose    Other orders  -     REVIEW OF HEALTH MAINTENANCE PROTOCOL ORDERS  -     Cancel: COVID-19 BIVALENT 18+ (MODERNA)  -     COVID-19 BIVALENT 12+ (PFIZER)  -     PRIMARY CARE FOLLOW-UP SCHEDULING; Future        Patient has been advised of split billing requirements and indicates understanding: Yes      COUNSELING:  Reviewed preventive health counseling, as reflected in patient instructions       Regular exercise       Healthy diet/nutrition       Vision screening       Hearing screening       Contraception       Family planning       Folic Acid        She reports that she has never smoked. She has never used smokeless tobacco.      Lauren Busby MD  [facility]

## 2023-07-10 NOTE — NURSING NOTE
Prior to immunization administration, verified patients identity using patient s name and date of birth. Please see Immunization Activity for additional information.     Screening Questionnaire for Adult Immunization    Are you sick today?   No   Do you have allergies to medications, food, a vaccine component or latex?   No   Have you ever had a serious reaction after receiving a vaccination?   No   Do you have a long-term health problem with heart, lung, kidney, or metabolic disease (e.g., diabetes), asthma, a blood disorder, no spleen, complement component deficiency, a cochlear implant, or a spinal fluid leak?  Are you on long-term aspirin therapy?   No   Do you have cancer, leukemia, HIV/AIDS, or any other immune system problem?   No   Do you have a parent, brother, or sister with an immune system problem?   No   In the past 3 months, have you taken medications that affect  your immune system, such as prednisone, other steroids, or anticancer drugs; drugs for the treatment of rheumatoid arthritis, Crohn s disease, or psoriasis; or have you had radiation treatments?   No   Have you had a seizure, or a brain or other nervous system problem?   No   During the past year, have you received a transfusion of blood or blood    products, or been given immune (gamma) globulin or antiviral drug?   No   For women: Are you pregnant or is there a chance you could become       pregnant during the next month?   No   Have you received any vaccinations in the past 4 weeks?   No     Immunization questionnaire answers were all negative.      Patient instructed to remain in clinic for 15 minutes afterwards, and to report any adverse reactions.     Screening performed by Colin Ramos MA on 7/10/2023 at 10:19 AM.

## 2023-08-30 ENCOUNTER — LAB (OUTPATIENT)
Dept: FAMILY MEDICINE | Facility: CLINIC | Age: 48
End: 2023-08-30

## 2023-08-30 ENCOUNTER — OFFICE VISIT (OUTPATIENT)
Dept: FAMILY MEDICINE | Facility: CLINIC | Age: 48
End: 2023-08-30
Payer: COMMERCIAL

## 2023-08-30 VITALS
HEART RATE: 60 BPM | TEMPERATURE: 98.1 F | WEIGHT: 135.6 LBS | HEIGHT: 68 IN | RESPIRATION RATE: 12 BRPM | BODY MASS INDEX: 20.55 KG/M2 | DIASTOLIC BLOOD PRESSURE: 85 MMHG | OXYGEN SATURATION: 99 % | SYSTOLIC BLOOD PRESSURE: 133 MMHG

## 2023-08-30 DIAGNOSIS — Z12.11 SCREEN FOR COLON CANCER: ICD-10-CM
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
## 2023-08-30 PROCEDURE — 99213 OFFICE O/P EST LOW 20 MIN: CPT | Performed by: FAMILY MEDICINE

## 2023-08-30 RX ORDER — CEPHALEXIN 500 MG/1
500 CAPSULE ORAL 3 TIMES DAILY
Qty: 21 CAPSULE | Refills: 0 | Status: SHIPPED | OUTPATIENT
Start: 2023-08-30

## 2023-08-30 NOTE — PROGRESS NOTES
Assessment & Plan     Bug bite with infection, initial encounter  Keep the wound clean and dry, keep the legs elevated while resting, start on Keflex 3 times daily for 7 days recommended to add ibuprofen 400 mg 3 times daily for 3 days followed by 3 times daily as needed for pain or swelling  RTC in 1week if no better by then or sooner prn.  Patient verbalised understanding and is agreeable to the plan.    - cephALEXin (KEFLEX) 500 MG capsule; Take 1 capsule (500 mg) by mouth 3 times daily    Screen for colon cancer    - COLOGUARD([employer]); Future             Chart documentation done in part with Dragon Voice recognition Software. Although reviewed after completion, some word and grammatical error may remain.    See Patient Instructions    Corin Medina MD  [facility]    Jesus Manuel Schulte is a 48 year old, presenting for the following health issues:  Insect Bites  Patient is new to the provider, is here today with concerns of having pain and swelling around the insect bite site on the right lower leg, that she sustained while weeding in the yard 2 days ago  Patient denies previous history of insect bite allergy  Had 2 other insect bites on the left leg but they did not seem to be infected  Denies fever, chills      8/30/2023    10:20 AM   Additional Questions   Roomed by Vinita   Accompanied by Self         8/30/2023    10:20 AM   Patient Reported Additional Medications   Patient reports taking the following new medications None       History of Present Illness       Reason for visit:  Infected bug bite right ankle  Symptom onset:  1-3 days ago  Symptoms include:  Tender and painful somewhat itchy  Symptom intensity:  Mild  Symptom progression:  Worsening  Had these symptoms before:  No    She eats 4 or more servings of fruits and vegetables daily.She consumes 0 sweetened beverage(s) daily.She exercises with enough effort to increase her heart rate 20 to 29 minutes per day.  She  "exercises with enough effort to increase her heart rate 4 days per week.   She is taking medications regularly.               Review of Systems   CONSTITUTIONAL: NEGATIVE for fever, chills, change in weight  INTEGUMENTARY/SKIN: as above      Objective    /85 (BP Location: Right arm, Patient Position: Sitting, Cuff Size: Adult Regular)   Pulse 60   Temp 98.1  F (36.7  C) (Oral)   Resp 12   Ht 1.727 m (5' 8\")   Wt 61.5 kg (135 lb 9.6 oz)   LMP 08/05/2023 (Exact Date)   SpO2 99%   BMI 20.62 kg/m    Body mass index is 20.62 kg/m .  Physical Exam   GENERAL: healthy, alert and no distress  SKIN: Right lower leg-about 2.5 to 3 cm erythematous area with surrounding induration and inflammation, no drainage, bleeding noted  Warmth of the area noted  Left lower leg-2 erythematous macular area from insect bite with no sign of inflammation or infection                      "

## 2023-09-22 LAB — NONINV COLON CA DNA+OCC BLD SCRN STL QL: NEGATIVE

## 2023-09-25 NOTE — RESULT ENCOUNTER NOTE
Sharath Schulte,  Your Cologuard-colon cancer screening is negative, this is reassuring.  We will repeat this in 3 years.   Let me know if you have any questions. Take care.  Corin Medina MD

## 2024-09-11 ENCOUNTER — ANCILLARY PROCEDURE (OUTPATIENT)
Dept: MAMMOGRAPHY | Facility: CLINIC | Age: 49
End: 2024-09-11
Attending: FAMILY MEDICINE
Payer: COMMERCIAL

## 2024-09-11 DIAGNOSIS — Z12.31 VISIT FOR SCREENING MAMMOGRAM: ICD-10-CM

## 2024-09-11 PROCEDURE — 77063 BREAST TOMOSYNTHESIS BI: CPT | Mod: GC | Performed by: STUDENT IN AN ORGANIZED HEALTH CARE EDUCATION/TRAINING PROGRAM

## 2024-09-11 PROCEDURE — 77067 SCR MAMMO BI INCL CAD: CPT | Mod: GC | Performed by: STUDENT IN AN ORGANIZED HEALTH CARE EDUCATION/TRAINING PROGRAM

## 2024-09-14 ENCOUNTER — HEALTH MAINTENANCE LETTER (OUTPATIENT)
Age: 49
End: 2024-09-14

## 2024-10-09 ENCOUNTER — OFFICE VISIT (OUTPATIENT)
Dept: FAMILY MEDICINE | Facility: CLINIC | Age: 49
End: 2024-10-09
Payer: COMMERCIAL

## 2024-10-09 VITALS
WEIGHT: 134.9 LBS | BODY MASS INDEX: 20.45 KG/M2 | DIASTOLIC BLOOD PRESSURE: 71 MMHG | RESPIRATION RATE: 16 BRPM | TEMPERATURE: 97.3 F | SYSTOLIC BLOOD PRESSURE: 113 MMHG | OXYGEN SATURATION: 100 % | HEART RATE: 55 BPM | HEIGHT: 68 IN

## 2024-10-09 DIAGNOSIS — Z00.00 ROUTINE GENERAL MEDICAL EXAMINATION AT A HEALTH CARE FACILITY: Primary | ICD-10-CM

## 2024-10-09 LAB
CHOLEST SERPL-MCNC: 173 MG/DL
FASTING STATUS PATIENT QL REPORTED: YES
FASTING STATUS PATIENT QL REPORTED: YES
GLUCOSE SERPL-MCNC: 91 MG/DL (ref 70–99)
HDLC SERPL-MCNC: 74 MG/DL
LDLC SERPL CALC-MCNC: 87 MG/DL
NONHDLC SERPL-MCNC: 99 MG/DL
TRIGL SERPL-MCNC: 58 MG/DL

## 2024-10-09 PROCEDURE — 91320 SARSCV2 VAC 30MCG TRS-SUC IM: CPT | Performed by: FAMILY MEDICINE

## 2024-10-09 PROCEDURE — 36415 COLL VENOUS BLD VENIPUNCTURE: CPT | Performed by: FAMILY MEDICINE

## 2024-10-09 PROCEDURE — 80061 LIPID PANEL: CPT | Performed by: FAMILY MEDICINE

## 2024-10-09 PROCEDURE — 82947 ASSAY GLUCOSE BLOOD QUANT: CPT | Performed by: FAMILY MEDICINE

## 2024-10-09 PROCEDURE — 90480 ADMN SARSCOV2 VAC 1/ONLY CMP: CPT | Performed by: FAMILY MEDICINE

## 2024-10-09 PROCEDURE — 99396 PREV VISIT EST AGE 40-64: CPT | Mod: 25 | Performed by: FAMILY MEDICINE

## 2024-10-09 PROCEDURE — 90471 IMMUNIZATION ADMIN: CPT | Performed by: FAMILY MEDICINE

## 2024-10-09 PROCEDURE — 90656 IIV3 VACC NO PRSV 0.5 ML IM: CPT | Performed by: FAMILY MEDICINE

## 2024-10-09 SDOH — HEALTH STABILITY: PHYSICAL HEALTH: ON AVERAGE, HOW MANY DAYS PER WEEK DO YOU ENGAGE IN MODERATE TO STRENUOUS EXERCISE (LIKE A BRISK WALK)?: 4 DAYS

## 2024-10-09 ASSESSMENT — PAIN SCALES - GENERAL: PAINLEVEL: NO PAIN (0)

## 2024-10-09 ASSESSMENT — SOCIAL DETERMINANTS OF HEALTH (SDOH): HOW OFTEN DO YOU GET TOGETHER WITH FRIENDS OR RELATIVES?: MORE THAN THREE TIMES A WEEK

## 2024-10-09 NOTE — PROGRESS NOTES
Preventive Care Visit  [facility]  Lauren Busby MD, Family Medicine  Oct 9, 2024      Assessment & Plan     Routine general medical examination at a health care facility  Fasting for labs  Fasting lipid, fasting glucose obtained today  Vaccinations updated for flu and covid   pap NILM 09/2020 q 3yrs next 9/2025  Mammogram clear/dense breast  09/2024: repeat annually 092025  Colon cancer options : negative cologard 09/2023, repeat in 3 yrs 09/2026      MOM -40's. maternal aunty& grand mom-,in menpausal yrs. mom &  maternal aunty-negative genetic testing.  Reviewed genetic testing referral offered.Pt declined.    Patient has been advised of split billing requirements and indicates understanding: Yes        Counseling  Appropriate preventive services were addressed with this patient via screening, questionnaire, or discussion as appropriate for fall prevention, nutrition, physical activity, Tobacco-use cessation, social engagement, weight loss and cognition.  Checklist reviewing preventive services available has been given to the patient.  Reviewed patient's diet, addressing concerns and/or questions.   She is at risk for psychosocial distress and has been provided with information to reduce risk.           Jesus Manuel Schulte is a 49 year old, presenting for the following:  Physical        10/9/2024     9:24 AM   Additional Questions   Roomed by [facility]        Health Care Directive  Patient does not have a Health Care Directive or Living Will: Discussed advance care planning with patient; however, patient declined at this time.    HPI  pap NILM 09/2020 q 3yrs next 9/2025  Mammogram clear/dense breast  09/2024: repeat annually 092025  Colon cancer options : negative cologard 09/2023, repeat in 3 yrs 09/2026        10/9/2024   General Health   How would you rate your overall physical health? Good   Feel stress (tense, anxious, or unable to sleep) Only a little      (!) STRESS CONCERN       10/9/2024   Nutrition   Three or more servings of calcium each day? Yes   Diet: Regular (no restrictions)   How many servings of fruit and vegetables per day? 4 or more   How many sweetened beverages each day? 0-1            10/9/2024   Exercise   Days per week of moderate/strenous exercise 4 days            10/9/2024   Social Factors   Frequency of gathering with friends or relatives More than three times a week   Worry food won't last until get money to buy more No   Food not last or not have enough money for food? No   Do you have housing? (Housing is defined as stable permanent housing and does not include staying ouside in a car, in a tent, in an abandoned building, in an overnight shelter, or couch-surfing.) Yes   Are you worried about losing your housing? No   Lack of transportation? No   Unable to get utilities (heat,electricity)? No            10/9/2024   Dental   Dentist two times every year? Yes            10/9/2024   TB Screening   Were you born outside of the [place]? No            Today's PHQ-2 Score:       10/9/2024     9:21 AM   PHQ-2 ( 1999 [employer])   Q1: Little interest or pleasure in doing things 0   Q2: Feeling down, depressed or hopeless 0   PHQ-2 Score 0   Q1: Little interest or pleasure in doing things Not at all   Q2: Feeling down, depressed or hopeless Not at all   PHQ-2 Score 0           10/9/2024   Substance Use   Alcohol more than 3/day or more than 7/wk No   Do you use any other substances recreationally? No        Social History     Tobacco Use    Smoking status: Never    Smokeless tobacco: Never   Vaping Use    Vaping status: Never Used   Substance Use Topics    Alcohol use: Yes     Comment: Socially    Drug use: No           9/11/2024   LAST FHS-7 RESULTS   1st degree relative breast or ovarian cancer Yes   Any relative bilateral breast cancer Yes   Any male have breast cancer No   Any ONE woman have BOTH breast AND ovarian cancer No   Any woman with breast cancer before 50yrs Yes   2 or  more relatives with breast AND/OR ovarian cancer Yes   2 or more relatives with breast AND/OR bowel cancer Yes   MOM -40's. maternal aunty& grand mom-,in menpausal yrs. mom &  maternal aunty-negative genetic testing.  Reviewed genetic testing referral offered.Pt declined.        Mammogram Screening - Mammogram every 1-2 years updated in Health Maintenance based on mutual decision making        10/9/2024   STI Screening   New sexual partner(s) since last STI/HIV test? No        History of abnormal Pap smear: No - age 30-64 HPV with reflex Pap every 5 years recommended        Latest Ref Rng & Units 9/24/2020     9:03 AM 9/24/2020     8:58 AM 6/10/2015    10:16 AM   PAP / HPV   PAP (Historical)   NIL     HPV 16 DNA NEG^Negative Negative   Negative    HPV 18 DNA NEG^Negative Negative   Negative    Other HR HPV NEG^Negative Negative   Negative      ASCVD Risk   The 10-year ASCVD risk score (Loren SHEPARD, et al., 2019) is: 0.5%    Values used to calculate the score:      Age: 49 years      Sex: Female      Is Non- : No      Diabetic: No      Tobacco smoker: No      Systolic Blood Pressure: 113 mmHg      Is BP treated: No      HDL Cholesterol: 62 mg/dL      Total Cholesterol: 169 mg/dL        10/9/2024   Contraception/Family Planning   Questions about contraception or family planning No           Reviewed and updated as needed this visit by Provider                    BP Readings from Last 3 Encounters:   10/09/24 113/71   08/30/23 133/85   07/10/23 118/70    Wt Readings from Last 3 Encounters:   10/09/24 61.2 kg (134 lb 14.4 oz)   08/30/23 61.5 kg (135 lb 9.6 oz)   07/10/23 59.9 kg (132 lb)                      Review of Systems  Constitutional, HEENT, cardiovascular, pulmonary, GI, , musculoskeletal, neuro, skin, endocrine and psych systems are negative, except as otherwise noted.     Objective    Exam  /71 (BP Location: Left arm, Patient Position: Sitting, Cuff Size: Adult Regular)   " Pulse 55   Temp 97.3  F (36.3  C) (Skin)   Resp 16   Ht 1.715 m (5' 7.5\")   Wt 61.2 kg (134 lb 14.4 oz)   LMP 09/24/2024   SpO2 100%   Breastfeeding No   BMI 20.82 kg/m     Estimated body mass index is 20.82 kg/m  as calculated from the following:    Height as of this encounter: 1.715 m (5' 7.5\").    Weight as of this encounter: 61.2 kg (134 lb 14.4 oz).    Physical Exam  GENERAL: alert and no distress  EYES: Eyes grossly normal to inspection, PERRL and conjunctivae and sclerae normal  HENT: ear canals and TM's normal, nose and mouth without ulcers or lesions  NECK: no adenopathy, no asymmetry, masses, or scars  RESP: lungs clear to auscultation - no rales, rhonchi or wheezes  BREAST: normal without masses, tenderness or nipple discharge and no palpable axillary masses or adenopathy  CV: regular rate and rhythm, normal S1 S2, no S3 or S4, no murmur, click or rub, no peripheral edema  ABDOMEN: soft, nontender, no hepatosplenomegaly, no masses and bowel sounds normal  MS: no gross musculoskeletal defects noted, no edema  SKIN: no suspicious lesions or rashes  NEURO: Normal strength and tone, mentation intact and speech normal  PSYCH: mentation appears normal, affect normal/bright        Signed Electronically by: Lauren Busby MD    "

## 2024-10-09 NOTE — NURSING NOTE
Prior to immunization administration, verified patients identity using patient s name and date of birth. Please see Immunization Activity for additional information.     Screening Questionnaire for Adult Immunization    Are you sick today?   No   Do you have allergies to medications, food, a vaccine component or latex?   No   Have you ever had a serious reaction after receiving a vaccination?   No   Do you have a long-term health problem with heart, lung, kidney, or metabolic disease (e.g., diabetes), asthma, a blood disorder, no spleen, complement component deficiency, a cochlear implant, or a spinal fluid leak?  Are you on long-term aspirin therapy?   No   Do you have cancer, leukemia, HIV/AIDS, or any other immune system problem?   No   Do you have a parent, brother, or sister with an immune system problem?   No   In the past 3 months, have you taken medications that affect  your immune system, such as prednisone, other steroids, or anticancer drugs; drugs for the treatment of rheumatoid arthritis, Crohn s disease, or psoriasis; or have you had radiation treatments?   No   Have you had a seizure, or a brain or other nervous system problem?   No   During the past year, have you received a transfusion of blood or blood    products, or been given immune (gamma) globulin or antiviral drug?   No   For women: Are you pregnant or is there a chance you could become       pregnant during the next month?   No   Have you received any vaccinations in the past 4 weeks?   No     Immunization questionnaire answers were all negative.        Patient instructed to remain in clinic for 15 minutes afterwards, and to report any adverse reactions.     Screening performed by Colin Ramos MA on 10/9/2024 at 10:23 AM.

## 2024-10-09 NOTE — PROGRESS NOTES
{PROVIDER CHARTING PREFERENCE:858213}    Subjective   Franca is a 49 year old, presenting for the following health issues:  No chief complaint on file.  {(!) Visit Details have not yet been documented.  Please enter Visit Details and then use this list to pull in documentation. (Optional):602458}  HPI     {MA/LPN/RN Pre-Provider Visit Orders- hCG/UA/Strep (Optional):381140}  {SUPERLIST (Optional):429051}  {additonal problems for provider to add (Optional):907607}    {ROS Picklists (Optional):055756}      Objective    There were no vitals taken for this visit.  There is no height or weight on file to calculate BMI.  Physical Exam   {Exam List (Optional):210768}    {Diagnostic Test Results (Optional):477308}        Signed Electronically by: Lauren Busby MD  {Email feedback regarding this note to [email or web address]   :204738}   [facility]   Hospitalist Discharge Summary      Date of Admission:  12/5/2020  Date of Discharge:  12/10/2020  Discharging Provider: Clyde Garcia MD  Discharge Team: Hospitalist Service, Gold 8    Discharge Diagnoses   Back pain   Metastatic cancer likely from breast primary   DVT    Follow-ups Needed After Discharge   Follow-up Appointments     Adult [facility] Follow-up and recommended labs and tests      Follow up with primary care provider, Physician No Ref-Primary, within 30   days, to evaluate medication change, to evaluate treatment change and for   hospital follow- up. No follow up labs or test are needed.    Breast biopsy on December 17th , 2020   Primary care doctor within a month   Hematology/oncology physician within a month   Radiation oncology within a month        Appointments on [place] and/or [facility] (with [facility] or [facility]   provider or service). Call [phone] if you haven't heard regarding   these appointments within 7 days of discharge.         {Additional follow-up instructions/to-do's for PCP    :Pain control, wbc counts     Unresulted Labs Ordered in the Past 30 Days of this Admission     Date and Time Order Name Status Description    12/5/2020 1957 Cytology Non Gyn In process       These results will be followed up by primary care doctor     Discharge Disposition   Discharged to home  Condition at discharge: Stable      Hospital Course         47-year-old female with past medical history of nicotine addiction, DVTs, left breast mass presented to [facility] ED with concerns of back pain.  Imaging done at outside facility showed MRI lumbar spine with L4 lesion and no radiological evidence of cord compression.  Her presentation was nonconcerning for pancreatitis or urine infection which she was tested for on 12/5/2020 and 12/9/2020.    Patient was transferred to [facility] for further work-up of metastatic disease.  Further  imaging determined possibility of breast primary with metastatic disease to lumbar spine.  Patient is planned to have breast biopsy as an outpatient to histologically diagnose etiology of malignancy and plan radiation and chemotherapy for tumor.  During the course of the hospitalization patient's pain improved with opioid regimen and she was placed on laxatives to prevent constipation.    Due to interruptions in insurance coverage patient was not taking her blood thinner and had to be heparinized prior to being put on rivaroxaban which was confirmed to be approved by her insurance carrier.     She was also tested for Covid and an asymptomatic screen came back positive.  Patient was instructed to self isolate for 10 days and have a repeat Covid test on 15 December 2020 prior to her planned breast biopsy on December 17, 2020.    Her white cell count was slightly elevated due to Decadron use for back pain control, and infectious work-up with a chest x-ray and urine analysis was negative for etiology    The patient is being discharged in stable condition to home with follow-ups.            Consultations This Hospital Stay   SOCIAL WORK IP CONSULT  PSYCHIATRY IP CONSULT  INTERVENTIONAL RADIOLOGY ADULT/PEDS IP CONSULT  [facility] [place] [facility] IP CONSULT  NEUROSURGERY ADULT IP CONSULT  VASCULAR ACCESS CARE ADULT IP CONSULT  SURGERY GENERAL ADULT IP CONSULT  MEDICATION HISTORY IP PHARMACY CONSULT  PSYCHOLOGY ADULT IP CONSULT  PHARMACY IP CONSULT  PHARMACY IP CONSULT  ONCOLOGY ADULT IP CONSULT  PHARMACY LIAISON FOR MEDICATION COVERAGE CONSULT  RADIATION ONCOLOGY IP CONSULT  PHARMACY TO DOSE WARFARIN  INTERVENTIONAL RADIOLOGY ADULT/PEDS IP CONSULT  CARE MANAGEMENT / SOCIAL WORK IP CONSULT    Code Status   Full Code    Time Spent on this Encounter   IClyde MD, personally saw the patient today and spent greater than 30 minutes discharging this patient.       Clyde Garcia MD  [facility] [address] [place] [address]  BANK  [address] [facility] [address]  Phone: [phone]  ______________________________________________________________________    Physical Exam   Vital Signs: Temp: 98.6  F (37  C) Temp src: Axillary BP: (!) 144/78 Pulse: 74   Resp: 18 SpO2: 99 % O2 Device: None (Room air)    Weight: 235 lbs 14.28 oz  Constitutional: awake, alert, cooperative, no apparent distress, and appears stated age  Eyes: Lids and lashes normal, pupils equal, round and reactive to light, extra ocular muscles intact, sclera clear, conjunctiva normal  ENT: Normocephalic, without obvious abnormality, atraumatic, sinuses nontender on palpation, external ears without lesions, oral pharynx with moist mucous membranes, tonsils without erythema or exudates, gums normal and good dentition.  Hematologic / Lymphatic: no cervical lymphadenopathy  Respiratory: No increased work of breathing, good air exchange, clear to auscultation bilaterally, no crackles or wheezing  Cardiovascular: Normal apical impulse, regular rate and rhythm, normal S1 and S2, no S3 or S4, and no murmur noted  GI: No scars, normal bowel sounds, soft, non-distended, non-tender, no masses palpated, no hepatosplenomegally  Genitounirinary:   Skin: no bruising or bleeding  Musculoskeletal: There is no redness, warmth, or swelling of the joints.  Full range of motion noted.  Motor strength is 5 out of 5 all extremities bilaterally.  Tone is normal.  Neurologic: Awake, alert, oriented to name, place and time.  Cranial nerves II-XII are grossly intact.  Motor is 5 out of 5 bilaterally.  Cerebellar finger to nose, heel to shin intact.  Sensory is intact.  Babinski down going, Romberg negative, and gait is normal.  Neuropsychiatric: General: normal, calm and normal eye contact       Primary Care Physician   Physician No Ref-Primary    Discharge Orders      US Pelvic Complete with Transvaginal     Asymptomatic COVID-19 Virus (Coronavirus) by PCR     Oncology/Hematology Adult Referral       Reason for your hospital stay    Dear Teresa Sanderson,    Your were hospitalized at [facility] with back pain and treated with steriods.  Over your hospitalization your condition improved and today you are ready to be discharged.  You should continue to improve but if you develop fever, shortness of breath, chest pain, nausea or vomiting please seek medical attention.    We are suggesting the following medication changes:  Start taking long acting oxycodone 10mg q12 nd 5mg oxycodone only as needed for pain   Start taking rivaroxaban 15mg twice daily for 21 days and then transition to 20mg daily   Start taking quitiapine 100mg daily for pain and night time anxiety   Take miralax daily to prevent constipation     Please get the following tests done:  COVID swab on December 15th, 2020     Please set up an appointment with:  Breast biopsy on December 17th, 2020   Primary care doctor within a month   Hematology/oncology team within a month   Radiation oncology within a month     It was a pleasure meeting with you today. Thank you for allowing me and my team the privilege of caring for you during your hospitalization. You are the reason we are here, and I truly hope we provided you with the excellent service you deserve. Please let us know if there is anything else we can do for you so that we can be sure you are leaving completely satisfied with your care experience.    Your hospital unit at the time of discharge is 5th floor so if you have any questions please call the hospital at [phone] and ask to talk to a nurse on 5fth floor.     Sincerely,    Clyde Garcia MD  Internal Medicine Hospitalist  [facility]     Adult [facility] Follow-up and recommended labs and tests    Follow up with primary care provider, Physician No Ref-Primary, within 30 days, to evaluate medication change, to evaluate treatment change and for hospital follow- up. No follow up labs or test are  needed.    Breast biopsy on December 17th , 2020   Primary care doctor within a month   Hematology/oncology physician within a month   Radiation oncology within a month        Appointments on [place] and/or [facility] (with [facility] or [facility] provider or service). Call [phone] if you haven't heard regarding these appointments within 7 days of discharge.     Activity    Your activity upon discharge: activity as tolerated     Full Code     Diet    Follow this diet upon discharge: Orders Placed This Encounter      Regular Diet Adult       Significant Results and Procedures   Most Recent 3 CBC's:  Recent Labs   Lab Test 12/10/20  0537 12/09/20  0600 12/08/20  0537   WBC 11.1* 12.2* 7.6   HGB 11.8 11.7 11.7   MCV 89 88 89    245 243     Most Recent 3 BMP's:  Recent Labs   Lab Test 12/10/20  0537 12/09/20  0600 12/08/20  0537    137 136   POTASSIUM 4.0 4.5 4.4   CHLORIDE 105 105 104   CO2 27 29 26   BUN 11 8 9   CR 0.82 0.69 0.74   ANIONGAP 7 3 5   NANDO 9.7 9.6 9.7   * 120* 140*     Most Recent 2 LFT's:  Recent Labs   Lab Test 12/10/20  0537 12/09/20  0600   AST 14 17   ALT 30 23   ALKPHOS 108 116   BILITOTAL 0.3 0.1*   ,   Results for orders placed or performed during the hospital encounter of 12/05/20   Abd/pelvis CT no contrast - [facility] Protocol    Narrative    CT ABDOMEN AND PELVIS WITHOUT CONTRAST  12/5/2020 10:30 AM    CLINICAL HISTORY: Left flank pain.    TECHNIQUE: CT scan of the abdomen and pelvis was performed without IV  contrast. Multiplanar reformats were obtained. Dose reduction  techniques were used.  CONTRAST: None.    COMPARISON: None.    FINDINGS:   LOWER CHEST: Mild patchy groundglass opacities at both lung bases,  more prominent in the left lower lobe.    HEPATOBILIARY: Unremarkable. No hepatic masses are seen.    PANCREAS: Normal.    SPLEEN: Normal.    ADRENAL GLANDS: Normal.    KIDNEYS/BLADDER: Cortical cyst in the lower pole of the right kidney  measures 3 cm. Otherwise  unremarkable. No urinary calculi. No  hydronephrosis.    BOWEL: No bowel obstruction. No convincing evidence for colitis or  diverticulitis. Unremarkable appendix.    PELVIC ORGANS: Unremarkable.    LYMPH NODES: No enlarged lymph nodes are identified in the abdomen or  pelvis.    VASCULATURE: Unremarkable.    ADDITIONAL FINDINGS: None.    MUSCULOSKELETAL: Degenerative changes are noted in the lower lumbar  spine. There is an ill-defined lytic lesion within L4, with a rind of  associated soft tissue about the L4 vertebral body, suspicious for  malignancy. There are several small areas of subtle lytic change in  the pelvic bones, which could also represent metastatic disease (for  example series 3 image 183 along the anterior aspect of the left  acetabulum).      Impression    IMPRESSION:   1.  No urinary calculi or evidence for urinary obstruction.  2.  Ill-defined L4 lytic lesion with a rind of associated soft tissue  about the L4 vertebral body, suspicious for malignancy, such as  metastatic disease. Vertebral body infection is an additional  differential consideration, but is considered less likely. Lumbar  spine MRI is recommended for further characterization.  3.  Several smaller ill-defined areas of lytic change are noted within  the pelvic bones, which could also represent metastatic disease, and  should be further evaluated with MRI.  4.  There is a 3 cm low-density lesion in the lower pole of the right  kidney. This finding is not well evaluated on this noncontrast scan,  but most likely represents a cyst. Ultrasound is recommended to  confirm the cystic nature of this right renal lesion.    5.  I discussed these findings with Dr. Richards at 11:15 AM on  12/15/2020.    DANIEL FERRARA MD   Lumbar spine MRI w/o contrast - surgery >10 yrs or none    Narrative    MR LUMBAR SPINE WITHOUT CONTRAST 12/5/2020 12:22 PM     HISTORY: Back pain, risk factors (osteoporosis or chronic steroid use  or elderly). CT had  concern for lumbar mets +/- pelvic lytic lesions  2/2 left breast mass.    TECHNIQUE: Multiplanar multisequence images were obtained through the  lumbar spine without contrast.    COMPARISON: CT of the abdomen and pelvis on same date.    FINDINGS: Five lumbar type vertebral bodies are assumed. Posterior  alignment is normal. Vertebral body heights are maintained. There is  abnormal signal intensity in the majority of the L4 vertebral body.  There is an associated right-sided paraspinal mass best seen on axial  images 26 through 28 measuring approximately 2.3 x 1.5 x 3.7 cm. There  is also some minimal involvement of the left paraspinal soft tissues.  In addition, there is a focus of abnormal signal intensity in the  superior aspect of the L5 vertebral body. Focal marrow abnormalities  are also noted left iliac bone. The conus medullaris appearance with  its tip at the L1 level. Cauda equina nerve roots are normal. Please  see CT abdomen and pelvic report on same date for extraspinal soft  tissues.    T12-L1: Normal.    L1-L2: Normal.    L2-L3: Normal.    L3-L4: Facet hypertrophy. No stenosis.    L4-L5: Minimal disc bulging and facet hypertrophy is present causing  mild central canal stenosis, moderate right-sided foraminal stenosis  and mild left-sided foraminal stenosis. There is also a right  paraspinal mass and some infiltrative disease involving the L4  vertebral body.    L5-S1: Minimal posterior osteophyte formation and disc bulging is  present causing some mild to moderate bilateral neural foraminal  stenosis but no central canal stenosis.      Impression    IMPRESSION:  1. Focal bone marrow abnormalities most advanced at L4 where there is  complete involvement of the L4 vertebral body, right paraspinal mass,  and mild involvement of the left paraspinal tissues. Findings are most  likely due to metastatic disease. There is no evidence for any  epidural tumor.  2. Multilevel degenerative changes, most advanced  at L4-L5 where there  is mild central canal stenosis, moderate right-sided foraminal  stenosis and mild left-sided foraminal stenosis.    MALENA AMAYA MD   XR Chest Port 1 View    Narrative    Exam: AP chest radiograph dictated today.    HISTORY: COVID19 likely breast ca    COMPARISON: None.    FINDINGS: AP chest radiograph. Midline trachea is midline, normal  cardiomediastinal silhouette. Faint streaky interstitial perihilar  opacities. No pneumothorax or pleural effusion. No acute osseous or  upper abdominal abnormality.      Impression    IMPRESSION: Faint streaky perihilar interstitial opacities.    I have personally reviewed the examination and initial interpretation  and I agree with the findings.    TIFF SNOWDEN MD   CT Chest w Cont Abdomen Pelvis w/o & w Cont    Narrative    EXAMINATION: CT CHEST W CONT ABDOMEN PELVIS W/O & W CONT,  12/5/2020 8:49 PM    TECHNIQUE:  Helical CT images from the thoracic inlet through the  symphysis pubis were obtained  with contrast. Contrast dose: Isovue  370    COMPARISON: Same day    HISTORY: multiple lesions concerning for metastatic malignancy, breast  mass and spinal, renal, uterine, etc lesions    FINDINGS:    CHEST: Normal heart size, no pericardial effusion. Normal caliber  thoracic aorta and main pulmonary artery. Normal branching of the  thoracic great vessels. Normal thyroid. No suspicious lower cervical,  axillary, or mediastinal lymphadenopathy. Prominent, however not  enlarged left axillary lymph nodes. Normal caliber esophagus. There is  a 2.5 x 2.6 and on the left breast mass (series 3, image 109).  Questionable left nipple retraction noted.    Central tracheobronchial tree is widely patent. No pleural effusion or  pneumothorax. No focal pulmonary opacity or consolidation. No  suspicious pulmonary mass or nodule.    ABDOMEN/PELVIS: Unremarkable liver, gallbladder, pancreas, spleen,  adrenals, and left kidney. There is a 2.7 cm left inferior right  renal  simple cyst. Normal caliber small bowel. Colon is unremarkable.  Ill-defined mixed density multicystic lesions in the region of the  left ovary. Heterogenous enhancement of the uterus an thickened  endometrial stripe at 1.5 cm. Major abdominal vasculature is widely  patent. No suspicious mesenteric, retroperitoneal, inguinal, or pelvic  lymphadenopathy.    BONES: Peripherally calcified Schmorl's node of the superior endplate  of L5. Lytic lesion with superior endplate defect of the L4 vertebral  body extending into the bilateral pedicles. No acute osseous  abnormality. Similar lesion noted involving the T7 vertebral body.  Heterogeneous areas of lucency in the pelvis are also present.      Impression    IMPRESSION:  1. Left breast mass measuring approximately 2.5 x 2.6 cm, recommend  correlation with outside mammograms if available.  2. Lytic lesions of the T7 and L4 vertebral body, which are suspicious  for diastases.    I have personally reviewed the examination and initial interpretation  and I agree with the findings.    TIFF SNOWDEN MD   MR Brain w/o & w Contrast    Narrative     MR BRAIN W/O & W CONTRAST 12/7/2020 7:47 PM    Provided History: suspected metastatic breast cancer.  ICD-10:    Comparison: None.    Technique: Multiplanar T1-weighted, axial FLAIR, and susceptibility  images were obtained without intravenous contrast. Following  intravenous gadolinium-based contrast administration, axial  T2-weighted, diffusion, and T1-weighted images (in multiple planes)  were obtained.    Contrast: 10 mL Gadavist    Findings:  There is no mass effect, midline shift,  or evidence of intracranial  hemorrhage.  The ventricles are proportionate to the cerebral sulci.  Normal major vascular intracranial flow-voids.    Postcontrast images demonstrate no abnormal intracranial enhancement.     No abnormality of the skull marrow signal. The orbits are grossly  unremarkable.    The visualized portions of  paranasal sinuses are relatively clear. The  visualized portions of mastoid air cells are unremarkable.        Impression    Impression: Normal brain MRI.    I have personally reviewed the examination and initial interpretation  and I agree with the findings.    DAVID JAIMES MD   US Lower Extremity Venous Duplex Bilateral    Narrative    EXAMINATION: US LOWER EXTREMITY VENOUS DUPLEX BILATERAL  12/6/2020  6:48 AM      CLINICAL HISTORY: History of DVT and PE and calf pain, rule out DVT    COMPARISON: None        PROCEDURE COMMENTS: Ultrasound was performed of the deep venous system  of the right and left lower extremity using grayscale, color, and  spectral Doppler.    FINDINGS:  The right common femoral, femoral, popliteal, and posterior tibial  veins are visualized and are patent. Venous waveforms are normal.  There is normal response to compression.    The left common femoral, femoral, and posterior tibial veins are  visualized and are patent. Venous waveforms are normal. There is  normal response to compression.    There is echogenic thrombus in the left popliteal vein that  demonstrates partial compressibility.      Impression    IMPRESSION:.  1. Non-occlusive thrombus in the left popliteal vein.   2. No deep venous thrombosis in the right lower extremity.     Imaging findings discussed with Dr. Ruiz by Dr. Dylan Gonzales at  7:22AM on 12/6/2020.    I have personally reviewed the examination and initial interpretation  and I agree with the findings.    HOANG CAMPOS MD   XR Chest Port 1 View    Narrative    EXAM: XR Chest 1 view 12/9/2020 3:35 PM      HISTORY: to rule out pneumonia given elevated white cell count noted  today,.    COMPARISON: Chest x-ray dated 12/5/2020.     TECHNIQUE: Frontal view of the chest.    FINDINGS: Trachea is midline. Cardiomediastinal silhouette is stable.  Apices are clear. No pneumothoraces. Lung fields are clear. No pleural  effusions. No acute osseous abnormalities. Degenerative  changes of  bilateral acromioclavicular joints.      Impression    IMPRESSION: Resolved streaky perihilar interstitial opacities. No  acute airspace opacities seen.    I have personally reviewed the examination and initial interpretation  and I agree with the findings.    DMITRIY GLEASON MD       Discharge Medications   Current Discharge Medication List      START taking these medications    Details   dexamethasone (DECADRON) 4 MG tablet Take 1 tablet (4 mg) by mouth every 6 hours  Qty: 120 tablet, Refills: 1    Comments: Please take every 6 hours till your appointment with oncology services  Associated Diagnoses: Spine metastasis (H)      Lidocaine (LIDOCARE) 4 % Patch Place 1-2 patches onto the skin every 24 hours To prevent lidocaine toxicity, patient should be patch free for 12 hrs daily.  Qty: 30 patch, Refills: 1    Associated Diagnoses: Spine metastasis (H)      methocarbamol (ROBAXIN) 500 MG tablet Take 1 tablet (500 mg) by mouth 4 times daily as needed for muscle spasms  Qty: 60 tablet, Refills: 0    Comments: Please take as needed upto 4 times a day as needed for back pain  Associated Diagnoses: Spine metastasis (H)      morphine (MS CONTIN) 15 MG CR tablet Take 1 tablet (15 mg) by mouth every 12 hours  Qty: 10 tablet, Refills: 0    Comments: Pls take twice daily  Associated Diagnoses: Spine metastasis (H)      nicotine (NICODERM CQ) 14 MG/24HR 24 hr patch Place 1 patch onto the skin daily  Qty: 30 patch, Refills: 1    Associated Diagnoses: Spine metastasis (H)      ondansetron (ZOFRAN-ODT) 4 MG ODT tab Take 1 tablet (4 mg) by mouth every 6 hours as needed for nausea or vomiting  Qty: 120 tablet, Refills: 0    Comments: Please take upto 6 hours daily as needed for nausea  Associated Diagnoses: Spine metastasis (H)      oxyCODONE (ROXICODONE) 5 MG tablet Take 1 tablet (5 mg) by mouth every 8 hours as needed for moderate to severe pain  Qty: 10 tablet, Refills: 0    Comments: Please take every 4 hours only if  pain is not well controlled, seak medical attention If pain not controlled  Associated Diagnoses: Spine metastasis (H)      pantoprazole (PROTONIX) 40 MG EC tablet Take 1 tablet (40 mg) by mouth every morning (before breakfast)  Qty: 30 tablet, Refills: 1    Comments: Please take daily to prevent acid reflux from steroid use  Associated Diagnoses: Spine metastasis (H)      polyethylene glycol (MIRALAX) 17 GM/Dose powder Take 17 g by mouth daily  Qty: 510 g, Refills: 1    Comments: Please take daily to prevent opoid related constipation  Associated Diagnoses: Spine metastasis (H)      QUEtiapine (SEROQUEL) 100 MG tablet Take 1 tablet (100 mg) by mouth At Bedtime  Qty: 30 tablet, Refills: 1    Associated Diagnoses: Spine metastasis (H)      rivaroxaban ANTICOAGULANT (XARELTO) 15 MG TABS tablet Take 1 tablet (15 mg) by mouth 2 times daily (with meals)  Qty: 42 tablet, Refills: 0    Comments: Please take 15 mg twice daily  for 21 days and then switch to 20 mg oral dose.  Associated Diagnoses: Spine metastasis (H)      sertraline (ZOLOFT) 50 MG tablet Take 1 tablet (50 mg) by mouth daily  Qty: 30 tablet, Refills: 1    Associated Diagnoses: Spine metastasis (H)         STOP taking these medications       acetaminophen (TYLENOL) 325 MG tablet Comments:   Reason for Stopping:         apixaban ANTICOAGULANT (ELIQUIS) 5 MG tablet Comments:   Reason for Stopping:         ibuprofen (ADVIL/MOTRIN) 200 MG tablet Comments:   Reason for Stopping:             Allergies   No Known Allergies

## 2024-10-09 NOTE — PATIENT INSTRUCTIONS
Patient Education   Preventive Care Advice   This is general advice given by our system to help you stay healthy. However, your care team may have specific advice just for you. Please talk to your care team about your preventive care needs.  Nutrition  Eat 5 or more servings of fruits and vegetables each day.  Try wheat bread, brown rice and whole grain pasta (instead of white bread, rice, and pasta).  Get enough calcium and vitamin D. Check the label on foods and aim for 100% of the RDA (recommended daily allowance).  Lifestyle  Exercise at least 150 minutes each week  (30 minutes a day, 5 days a week).  Do muscle strengthening activities 2 days a week. These help control your weight and prevent disease.  No smoking.  Wear sunscreen to prevent skin cancer.  Have a dental exam and cleaning every 6 months.  Yearly exams  See your health care team every year to talk about:  Any changes in your health.  Any medicines your care team has prescribed.  Preventive care, family planning, and ways to prevent chronic diseases.  Shots (vaccines)   HPV shots (up to age 26), if you've never had them before.  Hepatitis B shots (up to age 59), if you've never had them before.  COVID-19 shot: Get this shot when it's due.  Flu shot: Get a flu shot every year.  Tetanus shot: Get a tetanus shot every 10 years.  Pneumococcal, hepatitis A, and RSV shots: Ask your care team if you need these based on your risk.  Shingles shot (for age 50 and up)  General health tests  Diabetes screening:  Starting at age 35, Get screened for diabetes at least every 3 years.  If you are younger than age 35, ask your care team if you should be screened for diabetes.  Cholesterol test: At age 39, start having a cholesterol test every 5 years, or more often if advised.  Bone density scan (DEXA): At age 50, ask your care team if you should have this scan for osteoporosis (brittle bones).  Hepatitis C: Get tested at least once in your life.  STIs (sexually  transmitted infections)  Before age 24: Ask your care team if you should be screened for STIs.  After age 24: Get screened for STIs if you're at risk. You are at risk for STIs (including HIV) if:  You are sexually active with more than one person.  You don't use condoms every time.  You or a partner was diagnosed with a sexually transmitted infection.  If you are at risk for HIV, ask about PrEP medicine to prevent HIV.  Get tested for HIV at least once in your life, whether you are at risk for HIV or not.  Cancer screening tests  Cervical cancer screening: If you have a cervix, begin getting regular cervical cancer screening tests starting at age 21.  Breast cancer scan (mammogram): If you've ever had breasts, begin having regular mammograms starting at age 40. This is a scan to check for breast cancer.  Colon cancer screening: It is important to start screening for colon cancer at age 45.  Have a colonoscopy test every 10 years (or more often if you're at risk) Or, ask your provider about stool tests like a FIT test every year or Cologuard test every 3 years.  To learn more about your testing options, visit:   .  For help making a decision, visit:   https://bit.ly/qz46014.  Prostate cancer screening test: If you have a prostate, ask your care team if a prostate cancer screening test (PSA) at age 55 is right for you.  Lung cancer screening: If you are a current or former smoker ages 50 to 80, ask your care team if ongoing lung cancer screenings are right for you.  For informational purposes only. Not to replace the advice of your health care provider. Copyright   2023 [employer]. All rights reserved. Clinically reviewed by the [facility] Transitions Program. [employer] 361986 - REV 01/24.

## (undated) DEVICE — SU MONOCRYL 4-0 PS-1 27'  UND Y935H

## (undated) DEVICE — SU VICRYL 3-0 SH 27" J316H

## (undated) DEVICE — PREP CHLORAPREP W/ORANGE TINT 10.5ML 930715

## (undated) DEVICE — SU MONOCRYL 4-0 PS-2 18" UND Y496G